# Patient Record
Sex: FEMALE | Race: BLACK OR AFRICAN AMERICAN | NOT HISPANIC OR LATINO | Employment: FULL TIME | URBAN - METROPOLITAN AREA
[De-identification: names, ages, dates, MRNs, and addresses within clinical notes are randomized per-mention and may not be internally consistent; named-entity substitution may affect disease eponyms.]

---

## 2017-12-06 ENCOUNTER — OFFICE VISIT (OUTPATIENT)
Dept: DERMATOLOGY | Facility: CLINIC | Age: 42
End: 2017-12-06
Payer: COMMERCIAL

## 2017-12-06 DIAGNOSIS — L70.0 ACNE VULGARIS: ICD-10-CM

## 2017-12-06 DIAGNOSIS — L81.9 DYSCHROMIA: ICD-10-CM

## 2017-12-06 DIAGNOSIS — B36.0 TINEA VERSICOLOR: Primary | ICD-10-CM

## 2017-12-06 PROCEDURE — 99999 PR PBB SHADOW E&M-NEW PATIENT-LVL II: CPT | Mod: PBBFAC,,, | Performed by: DERMATOLOGY

## 2017-12-06 PROCEDURE — 99203 OFFICE O/P NEW LOW 30 MIN: CPT | Mod: S$GLB,,, | Performed by: DERMATOLOGY

## 2017-12-06 RX ORDER — FLUCONAZOLE 200 MG/1
TABLET ORAL
Qty: 4 TABLET | Refills: 3 | Status: SHIPPED | OUTPATIENT
Start: 2017-12-06 | End: 2023-06-26 | Stop reason: SDUPTHER

## 2017-12-06 RX ORDER — TRETINOIN 0.25 MG/G
CREAM TOPICAL
Qty: 20 G | Refills: 6 | Status: SHIPPED | OUTPATIENT
Start: 2017-12-06 | End: 2018-12-06

## 2017-12-06 RX ORDER — DOXYCYCLINE 100 MG/1
CAPSULE ORAL
COMMUNITY
Start: 2017-10-03 | End: 2017-12-06

## 2017-12-06 RX ORDER — AMLODIPINE AND OLMESARTAN MEDOXOMIL 5; 20 MG/1; MG/1
TABLET ORAL
COMMUNITY
Start: 2017-11-07 | End: 2024-01-29 | Stop reason: SDUPTHER

## 2017-12-06 RX ORDER — FLUCONAZOLE 150 MG/1
TABLET ORAL
COMMUNITY
Start: 2017-08-31 | End: 2024-01-29 | Stop reason: DRUGHIGH

## 2017-12-06 RX ORDER — KETOCONAZOLE 2 G/100G
AEROSOL, FOAM TOPICAL
Qty: 100 G | Refills: 3 | Status: ON HOLD | OUTPATIENT
Start: 2017-12-06 | End: 2024-02-20 | Stop reason: HOSPADM

## 2017-12-06 RX ORDER — TERBINAFINE HYDROCHLORIDE 250 MG/1
250 TABLET ORAL
COMMUNITY
Start: 2016-12-12 | End: 2017-12-06

## 2017-12-06 RX ORDER — AMPICILLIN 500 MG/1
CAPSULE ORAL
COMMUNITY
Start: 2017-11-27 | End: 2024-01-29

## 2017-12-06 RX ORDER — BUPROPION HYDROCHLORIDE 150 MG/1
TABLET ORAL
COMMUNITY
Start: 2016-12-12 | End: 2017-12-06

## 2017-12-06 RX ORDER — METRONIDAZOLE 500 MG/1
TABLET ORAL
Status: ON HOLD | COMMUNITY
Start: 2017-11-14 | End: 2024-02-20 | Stop reason: HOSPADM

## 2017-12-06 RX ORDER — ALPRAZOLAM 1 MG/1
TABLET ORAL
COMMUNITY
Start: 2016-12-12 | End: 2017-12-06

## 2017-12-06 RX ORDER — ALPRAZOLAM 2 MG/1
TABLET ORAL
COMMUNITY
Start: 2017-09-20 | End: 2024-01-29 | Stop reason: SDUPTHER

## 2017-12-06 RX ORDER — BUPROPION HYDROCHLORIDE 150 MG/1
150 TABLET ORAL DAILY
COMMUNITY

## 2017-12-06 NOTE — PATIENT INSTRUCTIONS
Tinea Versicolor  This is a rash caused by a fungus in the top layers of the skin. This fungus is normally present in the pores of the skin and causes no symptoms. But when the fungus overgrows it causes a rash. The fungus grows more easily in hot climates, and on oily or sweaty skin. Health experts dont know why some people get this rash and others dont. Experts also dont know why the rash will suddenly appear in someone who has never had it before.  The rash is made up of irregular pale or tan spots and patches. The rash is usually on the neck, upper back, chest, and shoulders. You may have mild itching, especially if you become overheated. But it doesn't cause other symptoms. Because these spots don't change color with sun exposure like normal skin, the rash may be lighter or darker than your normal skin.  This rash is harmless and usually causes no symptoms. The only reason for treatment is to improve appearance. Follow the advice below to clear the rash. It might take several months for normal skin color to return.  Home care  · Use a medicated dandruff shampoo over your whole body while in the shower. Dont use soap. Let the shampoo stay on for at least a few minutes before rinsing off. Do this every day for 4 weeks.  · As a different treatment, you may buy an antifungal cream (miconazole or clotrimazole, both available without a prescription). Use this 2 times a day for 7 days.   · This rash is not contagious to others. It cant be spread if someone touches it. So you dont have to worry about exposing others at school, , or work.  Prevention  This fungus can come back again (recur) after treatment. To prevent return of the rash, use medicated dandruff shampoo over your whole body when in the shower. Do this once a month for the next year. This is very important to do in the summertime. That is when the rash is most likely to recur.  Other prevention tips include:  · Avoid oily skin  products  · Wear loose clothing. Try to let your skin stay cool and breathe.  · Use sunscreen and protect yourself from sunlight  · Avoid tanning beds  Follow-up care  Follow up with your healthcare provider, or as advised. Call your provider if the rash doesnt get better with the above treatment, or if new symptoms appear.  When to seek medical advice  Call your healthcare provider right away if any of these occur:  · Increasing redness of the rash  · Change in appearance of the rash  · Fever of 100.4ºF (38ºC) or higher, or as directed by your provider  Date Last Reviewed: 8/1/2016  © 6820-5157 [employer]. [address] [place] [address], [place] [address]. All rights reserved. This information is not intended as a substitute for professional medical care. Always follow your healthcare professional's instructions.

## 2017-12-06 NOTE — PROGRESS NOTES
Subjective:       Patient ID:  Bessy Egan is a 42 y.o. female who presents for   Chief Complaint   Patient presents with    Tinea     c/o tinea versicolor face and trunk x 20 years    Hyperpigmentation     c/o dark spots around eyes x 15 years     Spot     c/o light spots bilateral legs x 5 years      History of Present Illness: The patient presents with chief complaint of tinea versicolor.  Location: face, chest, back, and neck  Duration: 20 years  Signs/Symptoms: pruritis     Prior treatments: ketoconazole shampoo and cream, oral ketoconazole, hydrocortisone lotion          Review of Systems   Constitutional: Negative for fever and chills.   Gastrointestinal: Negative for nausea and vomiting.   Skin: Positive for itching and rash. Negative for daily sunscreen use, activity-related sunscreen use and recent sunburn.   Hematologic/Lymphatic: Does not bruise/bleed easily.        Objective:    Physical Exam   Constitutional: She appears well-developed and well-nourished. No distress.   Neurological: She is alert and oriented to person, place, and time. She is not disoriented.   Psychiatric: She has a normal mood and affect.   Skin:   Areas Examined (abnormalities noted in diagram):   Head / Face Inspection Performed  Neck Inspection Performed  Chest / Axilla Inspection Performed  Abdomen Inspection Performed  Back Inspection Performed  RUE Inspected  LUE Inspection Performed  RLE Inspected  LLE Inspection Performed  Nails and Digits Inspection Performed                       Assessment / Plan:        Tinea versicolor  -     ketoconazole 2 % Foam; AAA bid prn tinea versicolor  Dispense: 100 g; Refill: 3  -     fluconazole (DIFLUCAN) 200 MG Tab; 1 po biw x 2 weeks  Dispense: 4 tablet; Refill: 3  -     Pt c/o vehicle of cream and shampoo.  Will try foam and start diflucan prn flares.     Dyschromia  -     ketoconazole 2 % Foam; AAA bid prn tinea versicolor  Dispense: 100 g; Refill: 3  -     fluconazole  (DIFLUCAN) 200 MG Tab; 1 po biw x 2 weeks  Dispense: 4 tablet; Refill: 3  -     tretinoin (RETIN-A) 0.025 % cream; Apply pea-sized amount to entire face at bedtime.  If dryness, use every third night and increase as tolerated to every night.  Dispense: 20 g; Refill: 6  -     S/p use of HTC long-term on face.  Will start tretinoin cream and recommend daily sunscreen.     Acne vulgaris  -     tretinoin (RETIN-A) 0.025 % cream; Apply pea-sized amount to entire face at bedtime.  If dryness, use every third night and increase as tolerated to every night.  Dispense: 20 g; Refill: 6             Return if symptoms worsen or fail to improve.

## 2017-12-06 NOTE — LETTER
December 7, 2017      Fahad Russell MD  [address]  [place] [address]           [facility]  [address]  [place] [address]  Phone: [phone]  Fax: [phone]          Patient: Bessy Egan   MR Number: [id number]   YOB: 1975   Date of Visit: 12/6/2017       Dear Dr. Fahad Russell:    Thank you for referring Bessy Egan to me for evaluation. Attached you will find relevant portions of my assessment and plan of care.    If you have questions, please do not hesitate to call me. I look forward to following Bessy Egan along with you.    Sincerely,    Korin Schwab MD    Enclosure  CC:  No Recipients    If you would like to receive this communication electronically, please contact [email or web address] or [phone] to request more information on [email or web address] [employer] access.    For providers and/or their staff who would like to refer a patient to [facility], please contact us through our one-stop-shop provider referral line, [facility] , at [phone].    If you feel you have received this communication in error or would no longer like to receive these types of communications, please e-mail [email or web address]

## 2018-01-24 ENCOUNTER — OFFICE VISIT (OUTPATIENT)
Dept: URGENT CARE | Facility: CLINIC | Age: 43
End: 2018-01-24
Payer: COMMERCIAL

## 2018-01-24 VITALS
DIASTOLIC BLOOD PRESSURE: 79 MMHG | SYSTOLIC BLOOD PRESSURE: 141 MMHG | HEIGHT: 69 IN | TEMPERATURE: 99 F | HEART RATE: 91 BPM | WEIGHT: 178.56 LBS | BODY MASS INDEX: 26.45 KG/M2 | OXYGEN SATURATION: 98 %

## 2018-01-24 DIAGNOSIS — R06.2 WHEEZING: ICD-10-CM

## 2018-01-24 DIAGNOSIS — J01.00 ACUTE NON-RECURRENT MAXILLARY SINUSITIS: Primary | ICD-10-CM

## 2018-01-24 DIAGNOSIS — R05.9 COUGH: ICD-10-CM

## 2018-01-24 PROCEDURE — 3008F BODY MASS INDEX DOCD: CPT | Mod: S$GLB,,, | Performed by: NURSE PRACTITIONER

## 2018-01-24 PROCEDURE — 99999 PR PBB SHADOW E&M-EST. PATIENT-LVL III: CPT | Mod: PBBFAC,,, | Performed by: NURSE PRACTITIONER

## 2018-01-24 PROCEDURE — 99214 OFFICE O/P EST MOD 30 MIN: CPT | Mod: S$GLB,,, | Performed by: NURSE PRACTITIONER

## 2018-01-24 RX ORDER — ALBUTEROL SULFATE 90 UG/1
2 AEROSOL, METERED RESPIRATORY (INHALATION) EVERY 6 HOURS PRN
Qty: 18 G | Refills: 0 | Status: SHIPPED | OUTPATIENT
Start: 2018-01-24 | End: 2022-03-21

## 2018-01-24 RX ORDER — PREDNISONE 20 MG/1
20 TABLET ORAL DAILY
Qty: 4 TABLET | Refills: 0 | Status: SHIPPED | OUTPATIENT
Start: 2018-01-24 | End: 2018-01-28

## 2018-01-24 RX ORDER — PROMETHAZINE HYDROCHLORIDE AND PHENYLEPHRINE HYDROCHLORIDE 6.25; 5 MG/5ML; MG/5ML
5 SYRUP ORAL EVERY 8 HOURS PRN
Qty: 120 ML | Refills: 0 | Status: SHIPPED | OUTPATIENT
Start: 2018-01-24 | End: 2018-02-03

## 2018-01-24 RX ORDER — AZITHROMYCIN 500 MG/1
500 TABLET, FILM COATED ORAL DAILY
Qty: 5 TABLET | Refills: 0 | Status: SHIPPED | OUTPATIENT
Start: 2018-01-24 | End: 2018-01-24 | Stop reason: SDUPTHER

## 2018-01-24 RX ORDER — ALBUTEROL SULFATE 90 UG/1
2 AEROSOL, METERED RESPIRATORY (INHALATION) EVERY 6 HOURS PRN
Qty: 18 G | Refills: 0 | Status: SHIPPED | OUTPATIENT
Start: 2018-01-24 | End: 2018-01-24 | Stop reason: SDUPTHER

## 2018-01-24 RX ORDER — AZITHROMYCIN 500 MG/1
500 TABLET, FILM COATED ORAL DAILY
Qty: 5 TABLET | Refills: 0 | Status: SHIPPED | OUTPATIENT
Start: 2018-01-24 | End: 2018-01-29

## 2018-01-24 RX ORDER — PREDNISONE 20 MG/1
20 TABLET ORAL DAILY
Qty: 4 TABLET | Refills: 0 | Status: SHIPPED | OUTPATIENT
Start: 2018-01-24 | End: 2018-01-24 | Stop reason: SDUPTHER

## 2018-01-24 RX ORDER — PROMETHAZINE HYDROCHLORIDE AND PHENYLEPHRINE HYDROCHLORIDE 6.25; 5 MG/5ML; MG/5ML
5 SYRUP ORAL EVERY 8 HOURS PRN
Qty: 120 ML | Refills: 0 | Status: SHIPPED | OUTPATIENT
Start: 2018-01-24 | End: 2018-01-24 | Stop reason: SDUPTHER

## 2018-01-24 NOTE — LETTER
January 24, 2018      [facility]  [address]  [place] [address]  Phone: [phone]       Patient: Bessy Egan   YOB: 1975  Date of Visit: 01/24/2018    To Whom It May Concern:    Bart Egan  was at [facility] on 01/24/2018. She may return to work/school on 01/26/2018 with no restrictions. If you have any questions or concerns, or if I can be of further assistance, please do not hesitate to contact me.    Sincerely,      SHAYY Roldan

## 2018-01-25 NOTE — PATIENT INSTRUCTIONS
Sinusitis (Antibiotic Treatment)    The sinuses are air-filled spaces within the bones of the face. They connect to the inside of the nose. Sinusitis is an inflammation of the tissue lining the sinus cavity. Sinus inflammation can occur during a cold. It can also be due to allergies to pollens and other particles in the air. Sinusitis can cause symptoms of sinus congestion and fullness. A sinus infection causes fever, headache and facial pain. There is often green or yellow drainage from the nose or into the back of the throat (post-nasal drip). You have been given antibiotics to treat this condition.  Home care:  · Take the full course of antibiotics as instructed. Do not stop taking them, even if you feel better.  · Drink plenty of water, hot tea, and other liquids. This may help thin mucus. It also may promote sinus drainage.  · Heat may help soothe painful areas of the face. Use a towel soaked in hot water. Or,  the shower and direct the hot spray onto your face. Using a vaporizer along with a menthol rub at night may also help.   · An expectorant containing guaifenesin may help thin the mucus and promote drainage from the sinuses.  · Over-the-counter decongestants may be used unless a similar medicine was prescribed. Nasal sprays work the fastest. Use one that contains phenylephrine or oxymetazoline. First blow the nose gently. Then use the spray. Do not use these medicines more often than directed on the label or symptoms may get worse. You may also use tablets containing pseudoephedrine. Avoid products that combine ingredients, because side effects may be increased. Read labels. You can also ask the pharmacist for help. (NOTE: Persons with high blood pressure should not use decongestants. They can raise blood pressure.)  · Over-the-counter antihistamines may help if allergies contributed to your sinusitis.    · Do not use nasal rinses or irrigation during an acute sinus infection, unless told to by  your health care provider. Rinsing may spread the infection to other sinuses.  · Use acetaminophen or ibuprofen to control pain, unless another pain medicine was prescribed. (If you have chronic liver or kidney disease or ever had a stomach ulcer, talk with your doctor before using these medicines. Aspirin should never be used in anyone under 18 years of age who is ill with a fever. It may cause severe liver damage.)  · Don't smoke. This can worsen symptoms.  Follow-up care  Follow up with your healthcare provider or our staff if you are not improving within the next week.  When to seek medical advice  Call your healthcare provider if any of these occur:  · Facial pain or headache becoming more severe  · Stiff neck  · Unusual drowsiness or confusion  · Swelling of the forehead or eyelids  · Vision problems, including blurred or double vision  · Fever of 100.4ºF (38ºC) or higher, or as directed by your healthcare provider  · Seizure  · Breathing problems  · Symptoms not resolving within 10 days  Date Last Reviewed: 4/13/2015  © 9303-6299 [employer]. [address] [place] [address], [place] [address]. All rights reserved. This information is not intended as a substitute for professional medical care. Always follow your healthcare professional's instructions.

## 2018-02-13 NOTE — PROGRESS NOTES
Subjective:       Patient ID: Bessy Egan is a 42 y.o. female.    Chief Complaint: Cough; Chest Congestion; Neck Pain; and Headache    42 year old female presents to Urgent Care with reports of sinus and chest congestion that has been present for about 2 weeks with no improvement. Denies any other problems or concerns at this time.       Sinusitis   This is a new problem. The current episode started 1 to 4 weeks ago. The problem has been gradually worsening since onset. There has been no fever. Her pain is at a severity of 3/10. The pain is moderate. Associated symptoms include congestion, coughing, sinus pressure and a sore throat. Pertinent negatives include no chills, ear pain or shortness of breath. Past treatments include nothing.     Review of Systems   Constitutional: Negative for appetite change, chills and fever.   HENT: Positive for congestion, sinus pressure and sore throat. Negative for ear pain and trouble swallowing.    Eyes: Negative for visual disturbance.   Respiratory: Positive for cough. Negative for shortness of breath.    Cardiovascular: Negative for chest pain.   Gastrointestinal: Negative for abdominal pain, diarrhea, nausea and vomiting.   Endocrine: Negative for cold intolerance, polyphagia and polyuria.   Genitourinary: Negative for decreased urine volume and dysuria.   Musculoskeletal: Negative for back pain.   Skin: Negative for rash.   Allergic/Immunologic: Negative for environmental allergies and food allergies.   Neurological: Negative for dizziness, tremors, weakness and numbness.   Hematological: Does not bruise/bleed easily.   Psychiatric/Behavioral: Negative for confusion and hallucinations. The patient is not nervous/anxious and is not hyperactive.    All other systems reviewed and are negative.      Objective:     Physical Exam   Constitutional: She is oriented to person, place, and time. She appears well-developed and well-nourished. No distress.   HENT:   Head:  Normocephalic.   Right Ear: Tympanic membrane, external ear and ear canal normal.   Left Ear: Tympanic membrane, external ear and ear canal normal.   Nose: No mucosal edema or rhinorrhea. Right sinus exhibits maxillary sinus tenderness. Right sinus exhibits no frontal sinus tenderness. Left sinus exhibits maxillary sinus tenderness. Left sinus exhibits no frontal sinus tenderness.   Mouth/Throat: Uvula is midline and oropharynx is clear and moist. No oropharyngeal exudate, posterior oropharyngeal edema or posterior oropharyngeal erythema.   Eyes: Conjunctivae and EOM are normal.   Neck: Normal range of motion. Neck supple.   Cardiovascular: Normal rate, regular rhythm and normal heart sounds.    Pulmonary/Chest: Effort normal and breath sounds normal. No accessory muscle usage. No apnea, no tachypnea and no bradypnea. No respiratory distress. She has no decreased breath sounds. She has no wheezes. She has no rhonchi. She has no rales.   Lymphadenopathy:        Head (right side): No submental, no submandibular and no tonsillar adenopathy present.        Head (left side): No submental, no submandibular and no tonsillar adenopathy present.     She has no cervical adenopathy.   Neurological: She is alert and oriented to person, place, and time.   Skin: Skin is warm and dry. She is not diaphoretic.     Assessment:     1. Acute non-recurrent maxillary sinusitis    2. Cough    3. Wheezing      Plan:   Bessy was seen today for cough, chest congestion, neck pain and headache.    Diagnoses and all orders for this visit:    Acute non-recurrent maxillary sinusitis    Cough    Wheezing    Other orders  -     Discontinue: albuterol 90 mcg/actuation inhaler; Inhale 2 puffs into the lungs every 6 (six) hours as needed for Wheezing. Rescue  -     Discontinue: azithromycin (ZITHROMAX) 500 MG tablet; Take 1 tablet (500 mg total) by mouth once daily.  -     Discontinue: predniSONE (DELTASONE) 20 MG tablet; Take 1 tablet (20 mg total) by  mouth once daily.  -     Discontinue: promethazine-phenylephrine (PROMETHAZINE VC) 6.25-5 mg/5 mL Syrp; Take 5 mLs by mouth every 8 (eight) hours as needed (cough at night).  -     predniSONE (DELTASONE) 20 MG tablet; Take 1 tablet (20 mg total) by mouth once daily.  -     azithromycin (ZITHROMAX) 500 MG tablet; Take 1 tablet (500 mg total) by mouth once daily.  -     promethazine-phenylephrine (PROMETHAZINE VC) 6.25-5 mg/5 mL Syrp; Take 5 mLs by mouth every 8 (eight) hours as needed (cough at night).  -     albuterol 90 mcg/actuation inhaler; Inhale 2 puffs into the lungs every 6 (six) hours as needed for Wheezing. Rescue    Instructed patient to follow prescribed treatment plan as directed and recommended follow up with PCP within 1 week or sooner if needed.

## 2024-01-29 ENCOUNTER — HOSPITAL ENCOUNTER (OUTPATIENT)
Dept: RADIOLOGY | Facility: HOSPITAL | Age: 49
Discharge: HOME | End: 2024-01-29
Attending: FAMILY MEDICINE
Payer: COMMERCIAL

## 2024-01-29 ENCOUNTER — OFFICE VISIT (OUTPATIENT)
Dept: INTERNAL MEDICINE | Facility: CLINIC | Age: 49
End: 2024-01-29
Payer: COMMERCIAL

## 2024-01-29 VITALS
HEIGHT: 69 IN | OXYGEN SATURATION: 97 % | DIASTOLIC BLOOD PRESSURE: 76 MMHG | TEMPERATURE: 98 F | BODY MASS INDEX: 27.75 KG/M2 | HEART RATE: 105 BPM | WEIGHT: 187.38 LBS | SYSTOLIC BLOOD PRESSURE: 122 MMHG

## 2024-01-29 DIAGNOSIS — Z13.1 DIABETES MELLITUS SCREENING: ICD-10-CM

## 2024-01-29 DIAGNOSIS — Z11.3 SCREEN FOR STD (SEXUALLY TRANSMITTED DISEASE): ICD-10-CM

## 2024-01-29 DIAGNOSIS — R22.2 LUMP OF SKIN OF BACK: ICD-10-CM

## 2024-01-29 DIAGNOSIS — F41.1 GENERALIZED ANXIETY DISORDER: Chronic | ICD-10-CM

## 2024-01-29 DIAGNOSIS — Z12.11 SCREEN FOR COLON CANCER: ICD-10-CM

## 2024-01-29 DIAGNOSIS — E78.5 HYPERLIPIDEMIA, UNSPECIFIED HYPERLIPIDEMIA TYPE: Chronic | ICD-10-CM

## 2024-01-29 DIAGNOSIS — B36.0 TINEA VERSICOLOR: Chronic | ICD-10-CM

## 2024-01-29 DIAGNOSIS — L81.9 DYSCHROMIA: Chronic | ICD-10-CM

## 2024-01-29 DIAGNOSIS — I10 PRIMARY HYPERTENSION: Primary | Chronic | ICD-10-CM

## 2024-01-29 DIAGNOSIS — J30.9 ALLERGIC RHINITIS, UNSPECIFIED SEASONALITY, UNSPECIFIED TRIGGER: Chronic | ICD-10-CM

## 2024-01-29 PROCEDURE — 4010F ACE/ARB THERAPY RXD/TAKEN: CPT | Mod: CPTII,S$GLB,, | Performed by: FAMILY MEDICINE

## 2024-01-29 PROCEDURE — 3044F HG A1C LEVEL LT 7.0%: CPT | Mod: CPTII,S$GLB,, | Performed by: FAMILY MEDICINE

## 2024-01-29 PROCEDURE — 3074F SYST BP LT 130 MM HG: CPT | Mod: CPTII,S$GLB,, | Performed by: FAMILY MEDICINE

## 2024-01-29 PROCEDURE — 76604 US EXAM CHEST: CPT | Mod: 26,,, | Performed by: RADIOLOGY

## 2024-01-29 PROCEDURE — 76604 US EXAM CHEST: CPT | Mod: TC,PO

## 2024-01-29 PROCEDURE — 1160F RVW MEDS BY RX/DR IN RCRD: CPT | Mod: CPTII,S$GLB,, | Performed by: FAMILY MEDICINE

## 2024-01-29 PROCEDURE — 3078F DIAST BP <80 MM HG: CPT | Mod: CPTII,S$GLB,, | Performed by: FAMILY MEDICINE

## 2024-01-29 PROCEDURE — 99204 OFFICE O/P NEW MOD 45 MIN: CPT | Mod: S$GLB,,, | Performed by: FAMILY MEDICINE

## 2024-01-29 PROCEDURE — 99999 PR PBB SHADOW E&M-EST. PATIENT-LVL IV: CPT | Mod: PBBFAC,,, | Performed by: FAMILY MEDICINE

## 2024-01-29 PROCEDURE — 3008F BODY MASS INDEX DOCD: CPT | Mod: CPTII,S$GLB,, | Performed by: FAMILY MEDICINE

## 2024-01-29 PROCEDURE — 1159F MED LIST DOCD IN RCRD: CPT | Mod: CPTII,S$GLB,, | Performed by: FAMILY MEDICINE

## 2024-01-29 RX ORDER — MONTELUKAST SODIUM 10 MG/1
10 TABLET ORAL DAILY
Qty: 90 TABLET | Refills: 2 | Status: SHIPPED | OUTPATIENT
Start: 2024-01-29

## 2024-01-29 RX ORDER — LEVOCETIRIZINE DIHYDROCHLORIDE 5 MG/1
5 TABLET, FILM COATED ORAL NIGHTLY
Qty: 90 TABLET | Refills: 2 | Status: SHIPPED | OUTPATIENT
Start: 2024-01-29

## 2024-01-29 RX ORDER — ROSUVASTATIN CALCIUM 10 MG/1
10 TABLET, COATED ORAL DAILY
Qty: 90 TABLET | Refills: 2 | Status: SHIPPED | OUTPATIENT
Start: 2024-01-29

## 2024-01-29 RX ORDER — AMLODIPINE AND OLMESARTAN MEDOXOMIL 5; 20 MG/1; MG/1
1 TABLET ORAL DAILY
Qty: 90 TABLET | Refills: 2 | Status: SHIPPED | OUTPATIENT
Start: 2024-01-29

## 2024-01-29 RX ORDER — FLUCONAZOLE 200 MG/1
TABLET ORAL
Qty: 24 TABLET | Refills: 1 | Status: SHIPPED | OUTPATIENT
Start: 2024-01-29 | End: 2024-04-29 | Stop reason: SDUPTHER

## 2024-01-29 RX ORDER — ALPRAZOLAM 2 MG/1
2 TABLET ORAL 2 TIMES DAILY PRN
Qty: 60 TABLET | Refills: 0 | Status: SHIPPED | OUTPATIENT
Start: 2024-01-29 | End: 2024-04-29 | Stop reason: SDUPTHER

## 2024-01-29 NOTE — PROGRESS NOTES
Subjective     Patient ID: Bessy Egan is a 48 y.o. female.    Chief Complaint: Establish Care    Patient presents to establish care. She has hypertension, hyperlipidemia, recurrent tinea versicolor, anxiety and allergies. Patient complains of nodule on upper left shoulder that she would like to get checked otu. Patient mentions Hot flashes and fibroid tumors. Not candidate for ocp due to smoking due to increased risk for blood clots      Review of Systems   Constitutional: Negative.    HENT: Negative.     Eyes:  Negative for discharge and redness.   Respiratory: Negative.     Cardiovascular: Negative.  Negative for chest pain, palpitations and leg swelling.   Gastrointestinal: Negative.  Negative for constipation and diarrhea.   Genitourinary:  Positive for hot flashes.   Musculoskeletal: Negative.  Negative for arthralgias and gait problem.   Neurological: Negative.  Negative for coordination difficulties.   Psychiatric/Behavioral: Negative.            Objective     Physical Exam  Vitals and nursing note reviewed.   Constitutional:       Appearance: Normal appearance. She is normal weight.   HENT:      Head: Normocephalic and atraumatic.   Eyes:      Extraocular Movements: Extraocular movements intact.   Cardiovascular:      Rate and Rhythm: Normal rate and regular rhythm.      Pulses: Normal pulses.      Heart sounds: Normal heart sounds.   Pulmonary:      Effort: Pulmonary effort is normal.      Breath sounds: Normal breath sounds.   Abdominal:      General: Abdomen is flat. Bowel sounds are normal.      Palpations: Abdomen is soft.   Musculoskeletal:      Right lower leg: No edema.      Left lower leg: No edema.   Skin:     General: Skin is warm and dry.      Comments: Soft fixed nodule upper left shoulder, nontender   Neurological:      General: No focal deficit present.      Mental Status: She is alert and oriented to person, place, and time.   Psychiatric:         Mood and Affect: Mood normal.          Behavior: Behavior normal.            Assessment and Plan     1. Primary hypertension  Comments:  stable on zackary  Orders:  -     amlodipine-olmesartan (ZACKARY) 5-20 mg per tablet; Take 1 tablet by mouth once daily.  Dispense: 90 tablet; Refill: 2  -     CBC W/ AUTO DIFFERENTIAL; Future; Expected date: 01/29/2024  -     COMPREHENSIVE METABOLIC PANEL; Future; Expected date: 01/29/2024  -     TSH; Future; Expected date: 01/29/2024  -     T4, free; Future; Expected date: 01/29/2024    2. Tinea versicolor  Comments:  chronic recurrent stable on fluconazole  Orders:  -     fluconazole (DIFLUCAN) 200 MG Tab; 1 po bi-weekly x 2 weeks  Dispense: 24 tablet; Refill: 1    3. Dyschromia  -     fluconazole (DIFLUCAN) 200 MG Tab; 1 po bi-weekly x 2 weeks  Dispense: 24 tablet; Refill: 1    4. Screen for colon cancer  -     Ambulatory referral/consult to Endo Procedure ; Future; Expected date: 01/30/2024    5. Hyperlipidemia, unspecified hyperlipidemia type  Comments:  stable on crestor  Orders:  -     rosuvastatin (CRESTOR) 10 MG tablet; Take 1 tablet (10 mg total) by mouth once daily.  Dispense: 90 tablet; Refill: 2  -     LIPID PANEL; Future; Expected date: 01/29/2024    6. Generalized anxiety disorder  Comments:  stable on xanax  Orders:  -     ALPRAZolam (XANAX) 2 MG Tab; Take 1 tablet (2 mg total) by mouth 2 (two) times daily as needed (anxiety).  Dispense: 60 tablet; Refill: 0    7. Screen for STD (sexually transmitted disease)  -     HEPATITIS C ANTIBODY; Future; Expected date: 01/29/2024  -     HIV 1/2 Ag/Ab (4th Gen); Future; Expected date: 01/29/2024    8. Diabetes mellitus screening  -     HEMOGLOBIN A1C; Future; Expected date: 01/29/2024    9. Lump of skin of back  Comments:  ultrasound to evaluate. has appearance of lipoma  Orders:  -     US Soft Tissue Chest_Upper Back; Future; Expected date: 01/29/2024    10. Allergic rhinitis, unspecified seasonality, unspecified trigger  Comments:  stable on singulair and  xyzal  Orders:  -     montelukast (SINGULAIR) 10 mg tablet; Take 1 tablet (10 mg total) by mouth once daily.  Dispense: 90 tablet; Refill: 2  -     levocetirizine (XYZAL) 5 MG tablet; Take 1 tablet (5 mg total) by mouth every evening.  Dispense: 90 tablet; Refill: 2               Follow up in about 3 months (around 4/29/2024).

## 2024-02-05 ENCOUNTER — HOSPITAL ENCOUNTER (OUTPATIENT)
Dept: PREADMISSION TESTING | Facility: HOSPITAL | Age: 49
Discharge: HOME | End: 2024-02-05
Attending: INTERNAL MEDICINE
Payer: COMMERCIAL

## 2024-02-05 DIAGNOSIS — Z12.11 SCREEN FOR COLON CANCER: ICD-10-CM

## 2024-02-07 ENCOUNTER — HOSPITAL ENCOUNTER (OUTPATIENT)
Dept: PREADMISSION TESTING | Facility: HOSPITAL | Age: 49
Discharge: HOME | End: 2024-02-07
Attending: INTERNAL MEDICINE
Payer: COMMERCIAL

## 2024-02-07 DIAGNOSIS — Z12.11 SCREEN FOR COLON CANCER: Primary | ICD-10-CM

## 2024-02-07 RX ORDER — SODIUM, POTASSIUM,MAG SULFATES 17.5-3.13G
1 SOLUTION, RECONSTITUTED, ORAL ORAL DAILY
Qty: 1 KIT | Refills: 0 | Status: SHIPPED | OUTPATIENT
Start: 2024-02-07 | End: 2024-02-09

## 2024-02-07 RX ORDER — SODIUM, POTASSIUM,MAG SULFATES 17.5-3.13G
1 SOLUTION, RECONSTITUTED, ORAL ORAL DAILY
Qty: 1 KIT | Refills: 0 | Status: CANCELLED | OUTPATIENT
Start: 2024-02-07 | End: 2024-02-09

## 2024-02-16 ENCOUNTER — ANESTHESIA EVENT (OUTPATIENT)
Dept: ENDOSCOPY | Facility: HOSPITAL | Age: 49
End: 2024-02-16
Payer: COMMERCIAL

## 2024-02-16 NOTE — ANESTHESIA PREPROCEDURE EVALUATION
02/16/2024  Bessy Egan is a 48 y.o., female.    Past Medical History:   Diagnosis Date    Hypertension     Kidney stones 01/23/2023     Past Surgical History:   Procedure Laterality Date    BREAST BIOPSY Left 2018    HERNIA REPAIR      kidney stones       There is no problem list on file for this patient.      Pre-op Assessment    I have reviewed the Patient Summary Reports.     I have reviewed the Nursing Notes. I have reviewed the NPO Status.   I have reviewed the Medications.     Review of Systems  Anesthesia Hx:  No problems with previous Anesthesia             Denies Family Hx of Anesthesia complications.    Denies Personal Hx of Anesthesia complications.                    Social:  Non-Smoker, No Alcohol Use       Hematology/Oncology:  Hematology Normal   Oncology Normal                                   EENT/Dental:  EENT/Dental Normal           Cardiovascular:     Hypertension                                        Pulmonary:  Pulmonary Normal                       Renal/:  Chronic Renal Disease renal calculi               Hepatic/GI:  Bowel Prep.                Musculoskeletal:  Musculoskeletal Normal                Neurological:  Neurology Normal                                      Endocrine:  Endocrine Normal            Dermatological:  Skin Normal    Psych:  Psychiatric Normal                    Physical Exam  General: Well nourished, Cooperative, Alert and Oriented    Airway:  Mallampati: II   Mouth Opening: Normal  TM Distance: Normal  Tongue: Normal  Neck ROM: Normal ROM    Dental:  Intact    Chest/Lungs:  Normal Respiratory Rate        Anesthesia Plan  Type of Anesthesia, risks & benefits discussed:    Anesthesia Type: Gen Natural Airway  Intra-op Monitoring Plan: Standard ASA Monitors  Post Op Pain Control Plan: multimodal analgesia  Induction:  IV  Informed Consent: Informed consent  signed with the Patient and all parties understand the risks and agree with anesthesia plan.  All questions answered. Patient consented to blood products? No  ASA Score: 2  Day of Surgery Review of History & Physical: H&P Update referred to the surgeon/provider.    Ready For Surgery From Anesthesia Perspective.     .

## 2024-02-19 ENCOUNTER — ANESTHESIA (OUTPATIENT)
Dept: ENDOSCOPY | Facility: HOSPITAL | Age: 49
End: 2024-02-19
Payer: COMMERCIAL

## 2024-02-19 ENCOUNTER — HOSPITAL ENCOUNTER (OUTPATIENT)
Facility: HOSPITAL | Age: 49
Discharge: HOME | End: 2024-02-19
Attending: INTERNAL MEDICINE | Admitting: INTERNAL MEDICINE
Payer: COMMERCIAL

## 2024-02-19 ENCOUNTER — HOSPITAL ENCOUNTER (OUTPATIENT)
Facility: HOSPITAL | Age: 49
Discharge: HOME | End: 2024-02-20
Attending: EMERGENCY MEDICINE | Admitting: HOSPITALIST
Payer: COMMERCIAL

## 2024-02-19 VITALS
DIASTOLIC BLOOD PRESSURE: 85 MMHG | HEART RATE: 87 BPM | WEIGHT: 186.75 LBS | BODY MASS INDEX: 27.66 KG/M2 | RESPIRATION RATE: 16 BRPM | SYSTOLIC BLOOD PRESSURE: 125 MMHG | OXYGEN SATURATION: 99 % | HEIGHT: 69 IN | TEMPERATURE: 97 F

## 2024-02-19 DIAGNOSIS — Z98.890 S/P COLONOSCOPIC POLYPECTOMY: Primary | ICD-10-CM

## 2024-02-19 DIAGNOSIS — Z12.11 ENCOUNTER FOR SCREENING COLONOSCOPY: Primary | ICD-10-CM

## 2024-02-19 DIAGNOSIS — R07.9 CHEST PAIN: ICD-10-CM

## 2024-02-19 PROBLEM — R55 SYNCOPE: Status: ACTIVE | Noted: 2024-02-19

## 2024-02-19 PROBLEM — K92.2 GI BLEED: Status: ACTIVE | Noted: 2024-02-19

## 2024-02-19 LAB
ALBUMIN SERPL BCP-MCNC: 3.9 G/DL (ref 3.5–5.2)
ALP SERPL-CCNC: 110 U/L (ref 55–135)
ALT SERPL W/O P-5'-P-CCNC: 21 U/L (ref 10–44)
ANION GAP SERPL CALC-SCNC: 13 MMOL/L (ref 8–16)
AST SERPL-CCNC: 20 U/L (ref 10–40)
B-HCG UR QL: NEGATIVE
BASOPHILS # BLD AUTO: 0.05 K/UL (ref 0–0.2)
BASOPHILS NFR BLD: 0.8 % (ref 0–1.9)
BILIRUB SERPL-MCNC: 0.5 MG/DL (ref 0.1–1)
BUN SERPL-MCNC: 8 MG/DL (ref 6–20)
CALCIUM SERPL-MCNC: 9.1 MG/DL (ref 8.7–10.5)
CHLORIDE SERPL-SCNC: 106 MMOL/L (ref 95–110)
CO2 SERPL-SCNC: 22 MMOL/L (ref 23–29)
CREAT SERPL-MCNC: 0.9 MG/DL (ref 0.5–1.4)
CTP QC/QA: YES
DIFFERENTIAL METHOD BLD: ABNORMAL
EOSINOPHIL # BLD AUTO: 0.1 K/UL (ref 0–0.5)
EOSINOPHIL NFR BLD: 1.7 % (ref 0–8)
ERYTHROCYTE [DISTWIDTH] IN BLOOD BY AUTOMATED COUNT: 16.6 % (ref 11.5–14.5)
EST. GFR  (NO RACE VARIABLE): >60 ML/MIN/1.73 M^2
GLUCOSE SERPL-MCNC: 125 MG/DL (ref 70–110)
HCT VFR BLD AUTO: 40.7 % (ref 37–48.5)
HGB BLD-MCNC: 13.1 G/DL (ref 12–16)
IMM GRANULOCYTES # BLD AUTO: 0.02 K/UL (ref 0–0.04)
IMM GRANULOCYTES NFR BLD AUTO: 0.3 % (ref 0–0.5)
LYMPHOCYTES # BLD AUTO: 2 K/UL (ref 1–4.8)
LYMPHOCYTES NFR BLD: 33.7 % (ref 18–48)
MCH RBC QN AUTO: 27.5 PG (ref 27–31)
MCHC RBC AUTO-ENTMCNC: 32.2 G/DL (ref 32–36)
MCV RBC AUTO: 86 FL (ref 82–98)
MONOCYTES # BLD AUTO: 0.5 K/UL (ref 0.3–1)
MONOCYTES NFR BLD: 7.8 % (ref 4–15)
NEUTROPHILS # BLD AUTO: 3.4 K/UL (ref 1.8–7.7)
NEUTROPHILS NFR BLD: 55.7 % (ref 38–73)
NRBC BLD-RTO: 0 /100 WBC
PLATELET # BLD AUTO: 340 K/UL (ref 150–450)
PMV BLD AUTO: 10.4 FL (ref 9.2–12.9)
POTASSIUM SERPL-SCNC: 3.5 MMOL/L (ref 3.5–5.1)
PROT SERPL-MCNC: 7 G/DL (ref 6–8.4)
RBC # BLD AUTO: 4.76 M/UL (ref 4–5.4)
SODIUM SERPL-SCNC: 141 MMOL/L (ref 136–145)
WBC # BLD AUTO: 6.03 K/UL (ref 3.9–12.7)

## 2024-02-19 PROCEDURE — 63600175 PHARM REV CODE 636 W HCPCS: Performed by: INTERNAL MEDICINE

## 2024-02-19 PROCEDURE — 88305 TISSUE EXAM BY PATHOLOGIST: CPT | Mod: 26,,, | Performed by: STUDENT IN AN ORGANIZED HEALTH CARE EDUCATION/TRAINING PROGRAM

## 2024-02-19 PROCEDURE — 37000008 HC ANESTHESIA 1ST 15 MINUTES: Performed by: INTERNAL MEDICINE

## 2024-02-19 PROCEDURE — 25000003 PHARM REV CODE 250: Performed by: NURSE ANESTHETIST, CERTIFIED REGISTERED

## 2024-02-19 PROCEDURE — 25000003 PHARM REV CODE 250: Performed by: INTERNAL MEDICINE

## 2024-02-19 PROCEDURE — 88305 TISSUE EXAM BY PATHOLOGIST: CPT | Performed by: STUDENT IN AN ORGANIZED HEALTH CARE EDUCATION/TRAINING PROGRAM

## 2024-02-19 PROCEDURE — G0378 HOSPITAL OBSERVATION PER HR: HCPCS

## 2024-02-19 PROCEDURE — 85025 COMPLETE CBC W/AUTO DIFF WBC: CPT | Performed by: NURSE PRACTITIONER

## 2024-02-19 PROCEDURE — 99285 EMERGENCY DEPT VISIT HI MDM: CPT | Mod: 25

## 2024-02-19 PROCEDURE — 63600175 PHARM REV CODE 636 W HCPCS: Performed by: NURSE ANESTHETIST, CERTIFIED REGISTERED

## 2024-02-19 PROCEDURE — 80053 COMPREHEN METABOLIC PANEL: CPT | Performed by: NURSE PRACTITIONER

## 2024-02-19 PROCEDURE — 27201012 HC FORCEPS, HOT/COLD, DISP: Performed by: INTERNAL MEDICINE

## 2024-02-19 PROCEDURE — 45380 COLONOSCOPY AND BIOPSY: CPT | Mod: 33,59,, | Performed by: INTERNAL MEDICINE

## 2024-02-19 PROCEDURE — 37000009 HC ANESTHESIA EA ADD 15 MINS: Performed by: INTERNAL MEDICINE

## 2024-02-19 PROCEDURE — 45385 COLONOSCOPY W/LESION REMOVAL: CPT | Mod: PT | Performed by: INTERNAL MEDICINE

## 2024-02-19 PROCEDURE — 27201089 HC SNARE, DISP (ANY): Performed by: INTERNAL MEDICINE

## 2024-02-19 PROCEDURE — 27200997: Performed by: INTERNAL MEDICINE

## 2024-02-19 PROCEDURE — 25500020 PHARM REV CODE 255: Performed by: EMERGENCY MEDICINE

## 2024-02-19 PROCEDURE — 25000003 PHARM REV CODE 250: Performed by: NURSE PRACTITIONER

## 2024-02-19 PROCEDURE — 45385 COLONOSCOPY W/LESION REMOVAL: CPT | Mod: 33,,, | Performed by: INTERNAL MEDICINE

## 2024-02-19 PROCEDURE — D9220A PRA ANESTHESIA: Mod: ,,, | Performed by: NURSE ANESTHETIST, CERTIFIED REGISTERED

## 2024-02-19 PROCEDURE — 81025 URINE PREGNANCY TEST: CPT | Performed by: INTERNAL MEDICINE

## 2024-02-19 PROCEDURE — 45380 COLONOSCOPY AND BIOPSY: CPT | Mod: PT,59 | Performed by: INTERNAL MEDICINE

## 2024-02-19 RX ORDER — ALPRAZOLAM 1 MG/1
2 TABLET ORAL 2 TIMES DAILY PRN
Status: DISCONTINUED | OUTPATIENT
Start: 2024-02-20 | End: 2024-02-20 | Stop reason: HOSPADM

## 2024-02-19 RX ORDER — MORPHINE SULFATE 4 MG/ML
2 INJECTION, SOLUTION INTRAMUSCULAR; INTRAVENOUS EVERY 4 HOURS PRN
Status: DISCONTINUED | OUTPATIENT
Start: 2024-02-19 | End: 2024-02-20 | Stop reason: HOSPADM

## 2024-02-19 RX ORDER — ACETAMINOPHEN 650 MG/1
650 SUPPOSITORY RECTAL EVERY 6 HOURS PRN
Status: DISCONTINUED | OUTPATIENT
Start: 2024-02-19 | End: 2024-02-20 | Stop reason: HOSPADM

## 2024-02-19 RX ORDER — ALUMINUM HYDROXIDE, MAGNESIUM HYDROXIDE, AND SIMETHICONE 1200; 120; 1200 MG/30ML; MG/30ML; MG/30ML
30 SUSPENSION ORAL 4 TIMES DAILY PRN
Status: DISCONTINUED | OUTPATIENT
Start: 2024-02-19 | End: 2024-02-20 | Stop reason: HOSPADM

## 2024-02-19 RX ORDER — DEXTROMETHORPHAN/PSEUDOEPHED 2.5-7.5/.8
DROPS ORAL
Status: COMPLETED | OUTPATIENT
Start: 2024-02-19 | End: 2024-02-19

## 2024-02-19 RX ORDER — IBUPROFEN 200 MG
24 TABLET ORAL
Status: DISCONTINUED | OUTPATIENT
Start: 2024-02-19 | End: 2024-02-20 | Stop reason: HOSPADM

## 2024-02-19 RX ORDER — AMLODIPINE BESYLATE 5 MG/1
5 TABLET ORAL DAILY
Status: DISCONTINUED | OUTPATIENT
Start: 2024-02-20 | End: 2024-02-20 | Stop reason: HOSPADM

## 2024-02-19 RX ORDER — LIDOCAINE HYDROCHLORIDE 20 MG/ML
INJECTION, SOLUTION EPIDURAL; INFILTRATION; INTRACAUDAL; PERINEURAL
Status: DISCONTINUED | OUTPATIENT
Start: 2024-02-19 | End: 2024-02-19

## 2024-02-19 RX ORDER — GLUCAGON 1 MG
1 KIT INJECTION
Status: DISCONTINUED | OUTPATIENT
Start: 2024-02-19 | End: 2024-02-20 | Stop reason: HOSPADM

## 2024-02-19 RX ORDER — NALOXONE HCL 0.4 MG/ML
0.02 VIAL (ML) INJECTION
Status: DISCONTINUED | OUTPATIENT
Start: 2024-02-19 | End: 2024-02-20 | Stop reason: HOSPADM

## 2024-02-19 RX ORDER — ONDANSETRON HYDROCHLORIDE 2 MG/ML
4 INJECTION, SOLUTION INTRAVENOUS EVERY 8 HOURS PRN
Status: DISCONTINUED | OUTPATIENT
Start: 2024-02-19 | End: 2024-02-20 | Stop reason: HOSPADM

## 2024-02-19 RX ORDER — ALPRAZOLAM 0.25 MG/1
0.25 TABLET ORAL ONCE AS NEEDED
Status: COMPLETED | OUTPATIENT
Start: 2024-02-19 | End: 2024-02-19

## 2024-02-19 RX ORDER — ATORVASTATIN CALCIUM 10 MG/1
10 TABLET, FILM COATED ORAL DAILY
Status: DISCONTINUED | OUTPATIENT
Start: 2024-02-20 | End: 2024-02-20 | Stop reason: HOSPADM

## 2024-02-19 RX ORDER — AMOXICILLIN 250 MG
1 CAPSULE ORAL 2 TIMES DAILY PRN
Status: DISCONTINUED | OUTPATIENT
Start: 2024-02-19 | End: 2024-02-20 | Stop reason: HOSPADM

## 2024-02-19 RX ORDER — SODIUM CHLORIDE 0.9 % (FLUSH) 0.9 %
10 SYRINGE (ML) INJECTION EVERY 12 HOURS PRN
Status: DISCONTINUED | OUTPATIENT
Start: 2024-02-19 | End: 2024-02-20 | Stop reason: HOSPADM

## 2024-02-19 RX ORDER — HYDROCODONE BITARTRATE AND ACETAMINOPHEN 5; 325 MG/1; MG/1
1 TABLET ORAL EVERY 6 HOURS PRN
Status: DISCONTINUED | OUTPATIENT
Start: 2024-02-19 | End: 2024-02-20 | Stop reason: HOSPADM

## 2024-02-19 RX ORDER — IPRATROPIUM BROMIDE AND ALBUTEROL SULFATE 2.5; .5 MG/3ML; MG/3ML
3 SOLUTION RESPIRATORY (INHALATION) EVERY 6 HOURS PRN
Status: DISCONTINUED | OUTPATIENT
Start: 2024-02-19 | End: 2024-02-20 | Stop reason: HOSPADM

## 2024-02-19 RX ORDER — IBUPROFEN 200 MG
16 TABLET ORAL
Status: DISCONTINUED | OUTPATIENT
Start: 2024-02-19 | End: 2024-02-20 | Stop reason: HOSPADM

## 2024-02-19 RX ORDER — LOSARTAN POTASSIUM 50 MG/1
50 TABLET ORAL DAILY
Status: DISCONTINUED | OUTPATIENT
Start: 2024-02-20 | End: 2024-02-20 | Stop reason: HOSPADM

## 2024-02-19 RX ORDER — AMLODIPINE AND OLMESARTAN MEDOXOMIL 5; 20 MG/1; MG/1
1 TABLET ORAL DAILY
Status: DISCONTINUED | OUTPATIENT
Start: 2024-02-20 | End: 2024-02-19

## 2024-02-19 RX ORDER — PROMETHAZINE HYDROCHLORIDE 25 MG/1
25 TABLET ORAL EVERY 6 HOURS PRN
Status: DISCONTINUED | OUTPATIENT
Start: 2024-02-19 | End: 2024-02-20 | Stop reason: HOSPADM

## 2024-02-19 RX ORDER — SODIUM CHLORIDE, SODIUM LACTATE, POTASSIUM CHLORIDE, CALCIUM CHLORIDE 600; 310; 30; 20 MG/100ML; MG/100ML; MG/100ML; MG/100ML
INJECTION, SOLUTION INTRAVENOUS CONTINUOUS
Status: DISCONTINUED | OUTPATIENT
Start: 2024-02-19 | End: 2024-02-19 | Stop reason: HOSPADM

## 2024-02-19 RX ORDER — ACETAMINOPHEN 325 MG/1
650 TABLET ORAL EVERY 8 HOURS PRN
Status: DISCONTINUED | OUTPATIENT
Start: 2024-02-19 | End: 2024-02-20 | Stop reason: HOSPADM

## 2024-02-19 RX ORDER — SYRING-NEEDL,DISP,INSUL,0.3 ML 29 G X1/2"
296 SYRINGE, EMPTY DISPOSABLE MISCELLANEOUS
Status: COMPLETED | OUTPATIENT
Start: 2024-02-19 | End: 2024-02-20

## 2024-02-19 RX ORDER — PROPOFOL 10 MG/ML
VIAL (ML) INTRAVENOUS
Status: DISCONTINUED | OUTPATIENT
Start: 2024-02-19 | End: 2024-02-19

## 2024-02-19 RX ORDER — TALC
6 POWDER (GRAM) TOPICAL NIGHTLY PRN
Status: DISCONTINUED | OUTPATIENT
Start: 2024-02-19 | End: 2024-02-20 | Stop reason: HOSPADM

## 2024-02-19 RX ADMIN — PROPOFOL 50 MG: 10 INJECTION, EMULSION INTRAVENOUS at 08:02

## 2024-02-19 RX ADMIN — SODIUM CHLORIDE, SODIUM LACTATE, POTASSIUM CHLORIDE, AND CALCIUM CHLORIDE: 600; 310; 30; 20 INJECTION, SOLUTION INTRAVENOUS at 07:02

## 2024-02-19 RX ADMIN — PROPOFOL 80 MG: 10 INJECTION, EMULSION INTRAVENOUS at 08:02

## 2024-02-19 RX ADMIN — IOHEXOL 100 ML: 350 INJECTION, SOLUTION INTRAVENOUS at 08:02

## 2024-02-19 RX ADMIN — PROPOFOL 50 MG: 10 INJECTION, EMULSION INTRAVENOUS at 07:02

## 2024-02-19 RX ADMIN — ALPRAZOLAM 0.25 MG: 0.25 TABLET ORAL at 10:02

## 2024-02-19 RX ADMIN — PROPOFOL 80 MG: 10 INJECTION, EMULSION INTRAVENOUS at 07:02

## 2024-02-19 RX ADMIN — LIDOCAINE HYDROCHLORIDE 40 MG: 20 INJECTION, SOLUTION EPIDURAL; INFILTRATION; INTRACAUDAL; PERINEURAL at 07:02

## 2024-02-19 RX ADMIN — PROPOFOL 40 MG: 10 INJECTION, EMULSION INTRAVENOUS at 07:02

## 2024-02-19 NOTE — DISCHARGE SUMMARY
[facility] 1st [facility]  Discharge Note  Short Stay    Procedure(s) (LRB):  COLONOSCOPY (N/A)      OUTCOME: Patient tolerated treatment/procedure well without complication and is now ready for discharge.    DISPOSITION: Home or Self Care    FINAL DIAGNOSIS:  Encounter for screening colonoscopy    FOLLOWUP: With primary care provider    DISCHARGE INSTRUCTIONS:  No discharge procedures on file.      Clinical Reference Documents Added to Patient Instructions         Document    COLON POLYPS (ENGLISH)    DIVERTICULOSIS DISCHARGE INSTRUCTIONS (ENGLISH)

## 2024-02-19 NOTE — H&P
Short Stay Endoscopy History and Physical    PCP - Shobha Mendes MD    Procedure - Colonoscopy  ASA - per anesthesia  Mallampati - per anesthesia  History of Anesthesia problems - no  Family history Anesthesia problems -  no     HPI:  This is a 48 y.o. female here for evaluation of :   Active Hospital Problems    Diagnosis  POA    *Encounter for screening colonoscopy [Z12.11]  Not Applicable      Resolved Hospital Problems   No resolved problems to display.         Health Maintenance         Date Due Completion Date    Pneumococcal Vaccines (Age 0-64) (1 of 2 - PCV) Never done ---    TETANUS VACCINE Never done ---    Colorectal Cancer Screening Never done ---    Influenza Vaccine (1) Never done ---    COVID-19 Vaccine (2 - 2023-24 season) 09/01/2023 3/13/2021    Mammogram 03/27/2024 3/27/2023    Hemoglobin A1c (Diabetic Prevention Screening) 01/29/2027 1/29/2024    Cervical Cancer Screening 03/27/2028 3/27/2023    Lipid Panel 01/29/2029 1/29/2024              ROS:  CONSTITUTIONAL: Denies weight change,  fatigue, fevers, chills, night sweats.  CARDIOVASCULAR: Denies chest pain, shortness of breath, orthopnea and edema.  RESPIRATORY: Denies cough, hemoptysis, dyspnea, and wheezing.  GI: See HPI.    Medical History:   Past Medical History:   Diagnosis Date    Hypertension     Kidney stones 01/23/2023       Surgical History:   Past Surgical History:   Procedure Laterality Date    BREAST BIOPSY Left 2018    HERNIA REPAIR      kidney stones         Family History:   Family History   Problem Relation Age of Onset    Diabetes Mother     Diabetes Maternal Grandmother     Diabetes Maternal Grandfather        Social History:   Social History     Tobacco Use    Smoking status: Some Days     Types: Cigarettes    Smokeless tobacco: Never   Substance Use Topics    Alcohol use: No    Drug use: No       Allergies:   Review of patient's allergies indicates:   Allergen Reactions    Ace inhibitors Anaphylaxis, Shortness  Of Breath and Swelling     Other reaction(s): Reaction:Facial swelling; Alternate Reaction: Anaphylaxis;       Medications:   No current facility-administered medications on file prior to encounter.     Current Outpatient Medications on File Prior to Encounter   Medication Sig Dispense Refill    ALPRAZolam (XANAX) 2 MG Tab Take 1 tablet (2 mg total) by mouth 2 (two) times daily as needed (anxiety). 60 tablet 0    amlodipine-olmesartan (ZACKARY) 5-20 mg per tablet Take 1 tablet by mouth once daily. 90 tablet 2    buPROPion (WELLBUTRIN XL) 150 MG TB24 tablet Take 150 mg by mouth once daily.      fluconazole (DIFLUCAN) 200 MG Tab 1 po bi-weekly x 2 weeks 24 tablet 1    ketoconazole 2 % Foam AAA bid prn tinea versicolor (Patient not taking: Reported on 3/27/2023) 100 g 3    levocetirizine (XYZAL) 5 MG tablet Take 1 tablet (5 mg total) by mouth every evening. 90 tablet 2    metroNIDAZOLE (FLAGYL) 500 MG tablet       montelukast (SINGULAIR) 10 mg tablet Take 1 tablet (10 mg total) by mouth once daily. 90 tablet 2    rosuvastatin (CRESTOR) 10 MG tablet Take 1 tablet (10 mg total) by mouth once daily. 90 tablet 2       Physical Exam:  Vital Signs: There were no vitals filed for this visit.  General Appearance: Well appearing in no acute distress  ENT: OP clear  Chest: CTA B  CV: RRR, no m/r/g  Abd: s/nt/nd/nabs  Ext: no edema    Labs:Reviewed    IMP:  Active Hospital Problems    Diagnosis  POA    *Encounter for screening colonoscopy [Z12.11]  Not Applicable      Resolved Hospital Problems   No resolved problems to display.         Plan:   I have explained the risks and benefits of colonoscopy to the patient including but not limited to bleeding, perforation, infection, and death. The patient wishes to proceed.

## 2024-02-19 NOTE — [employer] PATIENT INSTRUCTIONS
Discharge Summary/Instructions after an Endoscopic Procedure  Patient Name: Bessy Egan  Patient MRN: [id number]  Patient YOB: 1975 Monday, February 19, 2024  Julissa Dupree MD  Dear patient,  As a result of recent federal legislation (The Federal Cures Act), you may   receive lab or pathology results from your procedure in your [facility]   account before your physician is able to contact you. Your physician or   their representative will relay the results to you with their   recommendations at their soonest availability.  Thank you,  RESTRICTIONS:  During your procedure today, you received medications for sedation.  These   medications may affect your judgment, balance and coordination.  Therefore,   for 24 hours, you have the following restrictions:   - DO NOT drive a car, operate machinery, make legal/financial decisions,   sign important papers or drink alcohol.    ACTIVITY:  Today: no heavy lifting, straining or running due to procedural   sedation/anesthesia.  The following day: return to full activity including work.  DIET:  Eat and drink normally unless instructed otherwise.     TREATMENT FOR COMMON SIDE EFFECTS:  - Mild abdominal pain, nausea, belching, bloating or excessive gas:  rest,   eat lightly and use a heating pad.  - Sore Throat: treat with throat lozenges and/or gargle with warm salt   water.  - Because air was used during the procedure, expelling large amounts of air   from your rectum or belching is normal.  - If a bowel prep was taken, you may not have a bowel movement for 1-3 days.    This is normal.  SYMPTOMS TO WATCH FOR AND REPORT TO YOUR PHYSICIAN:  1. Abdominal pain or bloating, other than gas cramps.  2. Chest pain.  3. Back pain.  4. Signs of infection such as: chills or fever occurring within 24 hours   after the procedure.  5. Rectal bleeding, which would show as bright red, maroon, or black stools.   (A tablespoon of blood from the rectum is not serious, especially  if   hemorrhoids are present.)  6. Vomiting.  7. Weakness or dizziness.  GO DIRECTLY TO THE NEAREST EMERGENCY ROOM IF YOU HAVE ANY OF THE FOLLOWING:      Difficulty breathing              Chills and/or fever over 101 F   Persistent vomiting and/or vomiting blood   Severe abdominal pain   Severe chest pain   Black, tarry stools   Bleeding- more than one tablespoon   Any other symptom or condition that you feel may need urgent attention  Your doctor recommends these additional instructions:  If any biopsies were taken, your doctors clinic will contact you in 1 to 2   weeks with any results.  - Discharge patient to home (via wheelchair).   - Resume previous diet.   - Continue present medications.   - Await pathology results.   - Repeat colonoscopy in 3 years for surveillance.   - Patient has a contact number available for emergencies.  The signs and   symptoms of potential delayed complications were discussed with the   patient.  Return to normal activities tomorrow.  Written discharge   instructions were provided to the patient.  For questions, problems or results please call your physician Julissa Dupree MD at Work:  [phone]  If you have any questions about the above instructions, call the GI   department at [phone] or call the endoscopy unit at [phone]   from 7am until 3 pm.  [facility], EMERGENCY ROOM PHONE NUMBER:   [phone]  IF A COMPLICATION OR EMERGENCY SITUATION ARISES AND YOU ARE UNABLE TO REACH   YOUR PHYSICIAN - GO DIRECTLY TO THE EMERGENCY ROOM.  I have read or have had read to me these discharge instructions for my   procedure and have received a written copy.  I understand these   instructions and will follow-up with my physician if I have any questions.     __________________________________       _____________________________________  Nurse Signature                                          Patient/Designated   Responsible Party  Signature  MD Julissa Nolan MD  2/19/2024 8:13:08 AM  [employer]

## 2024-02-19 NOTE — ANESTHESIA POSTPROCEDURE EVALUATION
Anesthesia Post Evaluation    Patient: Bessy Egan    Procedure(s) Performed: Procedure(s) (LRB):  COLONOSCOPY (N/A)    Final Anesthesia Type: general      Patient location during evaluation: PACU  Patient participation: Yes- Able to Participate  Level of consciousness: awake  Post-procedure vital signs: reviewed and stable  Pain management: adequate  Airway patency: patent    PONV status at discharge: No PONV  Anesthetic complications: no      Cardiovascular status: stable  Respiratory status: unassisted  Hydration status: euvolemic  Follow-up not needed.              Vitals Value Taken Time   /85 02/19/24 0833   Temp 36.1 °C (97 °F) 02/19/24 0813   Pulse 87 02/19/24 0833   Resp 16 02/19/24 0833   SpO2 99 % 02/19/24 0833         Event Time   Out of Recovery 08:39:27         Pain/Jonathan Score: Jonathan Score: 10 (2/19/2024  8:33 AM)

## 2024-02-20 ENCOUNTER — CLINICAL SUPPORT (OUTPATIENT)
Dept: SMOKING CESSATION | Facility: CLINIC | Age: 49
End: 2024-02-20
Payer: COMMERCIAL

## 2024-02-20 ENCOUNTER — ANESTHESIA EVENT (OUTPATIENT)
Dept: ENDOSCOPY | Facility: HOSPITAL | Age: 49
End: 2024-02-20
Payer: COMMERCIAL

## 2024-02-20 ENCOUNTER — ANESTHESIA (OUTPATIENT)
Dept: ENDOSCOPY | Facility: HOSPITAL | Age: 49
End: 2024-02-20
Payer: COMMERCIAL

## 2024-02-20 DIAGNOSIS — F17.200 NICOTINE DEPENDENCE: Primary | ICD-10-CM

## 2024-02-20 PROBLEM — I10 HYPERTENSION: Status: ACTIVE | Noted: 2024-02-20

## 2024-02-20 PROBLEM — E78.5 HLD (HYPERLIPIDEMIA): Status: ACTIVE | Noted: 2024-02-20

## 2024-02-20 PROBLEM — F41.9 ANXIETY: Status: ACTIVE | Noted: 2024-02-20

## 2024-02-20 LAB
ALBUMIN SERPL BCP-MCNC: 3.6 G/DL (ref 3.5–5.2)
ALP SERPL-CCNC: 96 U/L (ref 55–135)
ALT SERPL W/O P-5'-P-CCNC: 18 U/L (ref 10–44)
ANION GAP SERPL CALC-SCNC: 9 MMOL/L (ref 8–16)
AST SERPL-CCNC: 16 U/L (ref 10–40)
BASOPHILS # BLD AUTO: 0.05 K/UL (ref 0–0.2)
BASOPHILS NFR BLD: 0.5 % (ref 0–1.9)
BILIRUB SERPL-MCNC: 0.5 MG/DL (ref 0.1–1)
BUN SERPL-MCNC: 7 MG/DL (ref 6–20)
CALCIUM SERPL-MCNC: 8.8 MG/DL (ref 8.7–10.5)
CHLORIDE SERPL-SCNC: 107 MMOL/L (ref 95–110)
CO2 SERPL-SCNC: 23 MMOL/L (ref 23–29)
CREAT SERPL-MCNC: 0.8 MG/DL (ref 0.5–1.4)
DIFFERENTIAL METHOD BLD: ABNORMAL
EOSINOPHIL # BLD AUTO: 0.1 K/UL (ref 0–0.5)
EOSINOPHIL NFR BLD: 0.5 % (ref 0–8)
ERYTHROCYTE [DISTWIDTH] IN BLOOD BY AUTOMATED COUNT: 16.4 % (ref 11.5–14.5)
EST. GFR  (NO RACE VARIABLE): >60 ML/MIN/1.73 M^2
GLUCOSE SERPL-MCNC: 110 MG/DL (ref 70–110)
HCT VFR BLD AUTO: 35.7 % (ref 37–48.5)
HGB BLD-MCNC: 11.3 G/DL (ref 12–16)
IMM GRANULOCYTES # BLD AUTO: 0.03 K/UL (ref 0–0.04)
IMM GRANULOCYTES NFR BLD AUTO: 0.3 % (ref 0–0.5)
LYMPHOCYTES # BLD AUTO: 2 K/UL (ref 1–4.8)
LYMPHOCYTES NFR BLD: 19.9 % (ref 18–48)
MCH RBC QN AUTO: 27.1 PG (ref 27–31)
MCHC RBC AUTO-ENTMCNC: 31.7 G/DL (ref 32–36)
MCV RBC AUTO: 86 FL (ref 82–98)
MONOCYTES # BLD AUTO: 0.6 K/UL (ref 0.3–1)
MONOCYTES NFR BLD: 6.3 % (ref 4–15)
NEUTROPHILS # BLD AUTO: 7.3 K/UL (ref 1.8–7.7)
NEUTROPHILS NFR BLD: 72.5 % (ref 38–73)
NRBC BLD-RTO: 0 /100 WBC
OHS QRS DURATION: 92 MS
OHS QTC CALCULATION: 432 MS
PLATELET # BLD AUTO: 298 K/UL (ref 150–450)
PMV BLD AUTO: 10.3 FL (ref 9.2–12.9)
POTASSIUM SERPL-SCNC: 4.2 MMOL/L (ref 3.5–5.1)
PROT SERPL-MCNC: 6 G/DL (ref 6–8.4)
RBC # BLD AUTO: 4.17 M/UL (ref 4–5.4)
SODIUM SERPL-SCNC: 139 MMOL/L (ref 136–145)
WBC # BLD AUTO: 10.09 K/UL (ref 3.9–12.7)

## 2024-02-20 PROCEDURE — 37000009 HC ANESTHESIA EA ADD 15 MINS: Performed by: INTERNAL MEDICINE

## 2024-02-20 PROCEDURE — 25000003 PHARM REV CODE 250: Performed by: HOSPITALIST

## 2024-02-20 PROCEDURE — 25000003 PHARM REV CODE 250: Performed by: INTERNAL MEDICINE

## 2024-02-20 PROCEDURE — 99406 BEHAV CHNG SMOKING 3-10 MIN: CPT | Mod: S$GLB,,,

## 2024-02-20 PROCEDURE — 27200997: Performed by: INTERNAL MEDICINE

## 2024-02-20 PROCEDURE — 88305 TISSUE EXAM BY PATHOLOGIST: CPT | Mod: 26,,, | Performed by: STUDENT IN AN ORGANIZED HEALTH CARE EDUCATION/TRAINING PROGRAM

## 2024-02-20 PROCEDURE — 27201089 HC SNARE, DISP (ANY): Performed by: INTERNAL MEDICINE

## 2024-02-20 PROCEDURE — 27202363 HC INJECTION AGENT, SUBMUCOSAL, ANY: Performed by: INTERNAL MEDICINE

## 2024-02-20 PROCEDURE — 25000003 PHARM REV CODE 250: Performed by: STUDENT IN AN ORGANIZED HEALTH CARE EDUCATION/TRAINING PROGRAM

## 2024-02-20 PROCEDURE — 27201028 HC NEEDLE, SCLERO: Performed by: INTERNAL MEDICINE

## 2024-02-20 PROCEDURE — 85025 COMPLETE CBC W/AUTO DIFF WBC: CPT | Performed by: HOSPITALIST

## 2024-02-20 PROCEDURE — G0378 HOSPITAL OBSERVATION PER HR: HCPCS

## 2024-02-20 PROCEDURE — 37000008 HC ANESTHESIA 1ST 15 MINUTES: Performed by: INTERNAL MEDICINE

## 2024-02-20 PROCEDURE — 88305 TISSUE EXAM BY PATHOLOGIST: CPT | Performed by: STUDENT IN AN ORGANIZED HEALTH CARE EDUCATION/TRAINING PROGRAM

## 2024-02-20 PROCEDURE — 80053 COMPREHEN METABOLIC PANEL: CPT | Performed by: HOSPITALIST

## 2024-02-20 PROCEDURE — 45381 COLONOSCOPY SUBMUCOUS NJX: CPT | Mod: ,,, | Performed by: INTERNAL MEDICINE

## 2024-02-20 PROCEDURE — 45381 COLONOSCOPY SUBMUCOUS NJX: CPT | Performed by: INTERNAL MEDICINE

## 2024-02-20 PROCEDURE — 27201042 HC RETRIEVAL NET: Performed by: INTERNAL MEDICINE

## 2024-02-20 PROCEDURE — 63600175 PHARM REV CODE 636 W HCPCS: Performed by: INTERNAL MEDICINE

## 2024-02-20 PROCEDURE — 36415 COLL VENOUS BLD VENIPUNCTURE: CPT | Performed by: HOSPITALIST

## 2024-02-20 PROCEDURE — 63600175 PHARM REV CODE 636 W HCPCS: Performed by: STUDENT IN AN ORGANIZED HEALTH CARE EDUCATION/TRAINING PROGRAM

## 2024-02-20 PROCEDURE — 25000003 PHARM REV CODE 250: Performed by: EMERGENCY MEDICINE

## 2024-02-20 PROCEDURE — 99205 OFFICE O/P NEW HI 60 MIN: CPT | Mod: 25,,, | Performed by: INTERNAL MEDICINE

## 2024-02-20 RX ORDER — LIDOCAINE HYDROCHLORIDE 10 MG/ML
INJECTION, SOLUTION EPIDURAL; INFILTRATION; INTRACAUDAL; PERINEURAL
Status: DISCONTINUED | OUTPATIENT
Start: 2024-02-20 | End: 2024-02-20

## 2024-02-20 RX ORDER — DEXTROMETHORPHAN/PSEUDOEPHED 2.5-7.5/.8
DROPS ORAL
Status: DISCONTINUED | OUTPATIENT
Start: 2024-02-20 | End: 2024-02-20 | Stop reason: HOSPADM

## 2024-02-20 RX ORDER — EPINEPHRINE 1 MG/ML
INJECTION, SOLUTION, CONCENTRATE INTRAVENOUS
Status: DISCONTINUED | OUTPATIENT
Start: 2024-02-20 | End: 2024-02-20 | Stop reason: HOSPADM

## 2024-02-20 RX ORDER — PROPOFOL 10 MG/ML
VIAL (ML) INTRAVENOUS
Status: DISCONTINUED | OUTPATIENT
Start: 2024-02-20 | End: 2024-02-20

## 2024-02-20 RX ORDER — SODIUM CHLORIDE, SODIUM LACTATE, POTASSIUM CHLORIDE, CALCIUM CHLORIDE 600; 310; 30; 20 MG/100ML; MG/100ML; MG/100ML; MG/100ML
INJECTION, SOLUTION INTRAVENOUS CONTINUOUS PRN
Status: DISCONTINUED | OUTPATIENT
Start: 2024-02-20 | End: 2024-02-20

## 2024-02-20 RX ADMIN — PROPOFOL 50 MG: 10 INJECTION, EMULSION INTRAVENOUS at 07:02

## 2024-02-20 RX ADMIN — LOSARTAN POTASSIUM 50 MG: 50 TABLET, FILM COATED ORAL at 08:02

## 2024-02-20 RX ADMIN — MAGNESIUM CITRATE 296 ML: 1.75 LIQUID ORAL at 12:02

## 2024-02-20 RX ADMIN — AMLODIPINE BESYLATE 5 MG: 5 TABLET ORAL at 08:02

## 2024-02-20 RX ADMIN — SODIUM CHLORIDE, SODIUM LACTATE, POTASSIUM CHLORIDE, AND CALCIUM CHLORIDE: 600; 310; 30; 20 INJECTION, SOLUTION INTRAVENOUS at 06:02

## 2024-02-20 RX ADMIN — PROPOFOL 100 MG: 10 INJECTION, EMULSION INTRAVENOUS at 07:02

## 2024-02-20 RX ADMIN — LIDOCAINE HYDROCHLORIDE 50 MG: 10 SOLUTION INTRAVENOUS at 06:02

## 2024-02-20 RX ADMIN — ATORVASTATIN CALCIUM 10 MG: 10 TABLET, FILM COATED ORAL at 08:02

## 2024-02-20 NOTE — BRIEF OP NOTE
Inpatient Endoscopy Brief Operative Note      Admit Date: 2/19/2024    Procedure Date and Time:  2/20/2024 7:35 AM    Attending Physician: Lavern New,*     Principal Admitting Diagnoses: GI bleed         Discharge Diagnosis: The primary encounter diagnosis was S/P colonoscopic polypectomy. A diagnosis of Chest pain was also pertinent to this visit.     Discharged Condition: Stable    Indication for Admission: GI bleed     Procedure Performed: Colonoscopy with polypectomy and control of hemorrhage    SEE [employer] REPORTS FOR DETAILS.    Pathology (if any):  Specimen (24h ago, onward)       Start     Ordered    02/20/24 0726  Specimen to Pathology, Surgery Gastrointestinal tract  Once        Comments: Pre-op Diagnosis: S/P colonoscopic polypectomy [Z98.890]Procedure(s):COLONOSCOPY Number of specimens: 1Name of specimens: 1 hepatic flexure polypectomy stalk-PLEASE COMMENT ON MARGINS     References:    Click here for ordering Quick Tip   Question Answer Comment   Procedure Type: Gastrointestinal tract    Which provider would you like to cc? BREANN HARRELL    Release to patient Immediate        02/20/24 0733                    Estimated Blood Loss: 2 ml.    Discussed with: patient.    Disposition: Return to hospital [facility].    Recommendations:   Clear liquid diet, ADAT  Ok to discharge home  No NSAIDs including ASA  No heavy lifting for next 3 weeks        Communicated with hospital medicine service regarding the above findings.       Breann Harrell MD  Inpatient Gastroenterology  [facility]

## 2024-02-20 NOTE — [employer]ATION PATIENT INSTRUCTIONS
Discharge Summary/Instructions after an Endoscopic Procedure  Patient Name: Bessy Egan  Patient MRN: [id number]  Patient YOB: 1975 Tuesday, February 20, 2024 Breann Harrell MD  Dear patient,  As a result of recent federal legislation (The Federal Cures Act), you may   receive lab or pathology results from your procedure in your [facility]   account before your physician is able to contact you. Your physician or   their representative will relay the results to you with their   recommendations at their soonest availability.  Thank you,  RESTRICTIONS:  During your procedure today, you received medications for sedation.  These   medications may affect your judgment, balance and coordination.  Therefore,   for 24 hours, you have the following restrictions:   - DO NOT drive a car, operate machinery, make legal/financial decisions,   sign important papers or drink alcohol.    ACTIVITY:  Today: no heavy lifting, straining or running due to procedural   sedation/anesthesia.  The following day: return to full activity including work.  DIET:  Eat and drink normally unless instructed otherwise.     TREATMENT FOR COMMON SIDE EFFECTS:  - Mild abdominal pain, nausea, belching, bloating or excessive gas:  rest,   eat lightly and use a heating pad.  - Sore Throat: treat with throat lozenges and/or gargle with warm salt   water.  - Because air was used during the procedure, expelling large amounts of air   from your rectum or belching is normal.  - If a bowel prep was taken, you may not have a bowel movement for 1-3 days.    This is normal.  SYMPTOMS TO WATCH FOR AND REPORT TO YOUR PHYSICIAN:  1. Abdominal pain or bloating, other than gas cramps.  2. Chest pain.  3. Back pain.  4. Signs of infection such as: chills or fever occurring within 24 hours   after the procedure.  5. Rectal bleeding, which would show as bright red, maroon, or black stools.   (A tablespoon of blood from the rectum is not serious, especially if    hemorrhoids are present.)  6. Vomiting.  7. Weakness or dizziness.  GO DIRECTLY TO THE NEAREST EMERGENCY ROOM IF YOU HAVE ANY OF THE FOLLOWING:      Difficulty breathing              Chills and/or fever over 101 F   Persistent vomiting and/or vomiting blood   Severe abdominal pain   Severe chest pain   Black, tarry stools   Bleeding- more than one tablespoon   Any other symptom or condition that you feel may need urgent attention  Your doctor recommends these additional instructions:  If any biopsies were taken, your doctors clinic will contact you in 1 to 2   weeks with any results.  - Discharge patient to home (with escort).   - Clear liquid diet.   - No aspirin, ibuprofen, naproxen, or other non-steroidal anti-inflammatory   drugs for 3 weeks.   - Try to avoid heavy lifting. No more than 20 lbs for 3 weeks.   - The findings and recommendations were discussed with the patient.   - Communicated with hospital medicine service regarding findings.  - Repeat colonoscopy in 3 years for surveillance based on pathology   results.  For questions, problems or results please call your physician Breann Harrell MD at Work:  [phone]  If you have any questions about the above instructions, call the GI   department at [phone] or call the endoscopy unit at [phone]   from 7am until 3 pm.  [facility], EMERGENCY ROOM PHONE NUMBER:   [phone]  IF A COMPLICATION OR EMERGENCY SITUATION ARISES AND YOU ARE UNABLE TO REACH   YOUR PHYSICIAN - GO DIRECTLY TO THE EMERGENCY ROOM.  I have read or have had read to me these discharge instructions for my   procedure and have received a written copy.  I understand these   instructions and will follow-up with my physician if I have any questions.     __________________________________       _____________________________________  Nurse Signature                                          Patient/Designated   Responsible Party Signature  Breann  MD Breann Harrell MD  2/20/2024 7:44:49 AM  This report has been verified and signed electronically.  Dear patient,  As a result of recent federal legislation (The Federal Cures Act), you may   receive lab or pathology results from your procedure in your [facility]   account before your physician is able to contact you. Your physician or   their representative will relay the results to you with their   recommendations at their soonest availability.  Thank you,  [employer]

## 2024-02-20 NOTE — FIRST PROVIDER EVALUATION
Medical screening examination initiated.  I have conducted a focused provider triage encounter, findings are as follows:    Brief history of present illness:  Patient presents with continued rectal bleeding after colonoscopy this morning.    There were no vitals filed for this visit.    Pertinent physical exam:  No acute distress noted    Brief workup plan:  Labs, imaging    Preliminary workup initiated; this workup will be continued and followed by the physician or advanced practice provider that is assigned to the patient when roomed.

## 2024-02-20 NOTE — INTERVAL H&P NOTE
The patient has been examined and the H&P has been reviewed:    I concur with the findings and no changes have occurred since H&P was written.    Anesthesia/Surgery risks, benefits and alternative options discussed and understood by patient/family.        The risks, benefits and alternatives of the procedure were discussed with the patient in detail. This discussion was had in the presence of endoscopy nursing staff. The risks include, risks of adverse reaction to sedation requiring the use of reversal agents, bleeding requiring blood transfusion, perforation requiring surgical intervention and technical failure. Other risks include aspiration leading to respiratory distress and respiratory failure resulting in endotracheal intubation and mechanical ventilation including death. If anesthesia is being utilized for this procedure, it is up to the anesthesiologist to determine airway safety including elective endotracheal intubation. Questions were answered, they agree to proceed. There was no language barriers.    Active Hospital Problems    Diagnosis  POA    *GI bleed [K92.2]  Yes     Priority: 1 - High    Hypertension [I10]  Yes    Anxiety [F41.9]  Yes    HLD (hyperlipidemia) [E78.5]  Yes    Syncope [R55]  Yes      Resolved Hospital Problems   No resolved problems to display.

## 2024-02-20 NOTE — ASSESSMENT & PLAN NOTE
Chronic, controlled. Latest blood pressure and vitals reviewed-     Temp:  [96.8 °F (36 °C)-98.5 °F (36.9 °C)]   Pulse:  []   Resp:  [15-20]   BP: (109-160)/(72-95)   SpO2:  [98 %-100 %] .   Home meds for hypertension were reviewed and noted below.   Hypertension Medications               amlodipine-olmesartan (ZACKARY) 5-20 mg per tablet Take 1 tablet by mouth once daily.     While in the hospital, will manage blood pressure as follows; Continue home antihypertensive regimen    Will utilize p.r.n. blood pressure medication only if patient's blood pressure greater than 160/100 and she develops symptoms such as worsening chest pain or shortness of breath.

## 2024-02-20 NOTE — SUBJECTIVE & OBJECTIVE
Past Medical History:   Diagnosis Date    Anxiety disorder, unspecified     Hypertension     Kidney stones 01/23/2023       Past Surgical History:   Procedure Laterality Date    BREAST BIOPSY Left 2018    COLONOSCOPY N/A 2/19/2024    Procedure: COLONOSCOPY;  Surgeon: Julissa Dupree MD;  Location: [facility];  Service: Endoscopy;  Laterality: N/A;    HERNIA REPAIR      kidney stones         Review of patient's allergies indicates:   Allergen Reactions    Ace inhibitors Anaphylaxis, Shortness Of Breath and Swelling     Other reaction(s): Reaction:Facial swelling; Alternate Reaction: Anaphylaxis;     Family History       Problem Relation (Age of Onset)    Diabetes Mother, Maternal Grandmother, Maternal Grandfather          Tobacco Use    Smoking status: Some Days     Types: Cigarettes    Smokeless tobacco: Never   Substance and Sexual Activity    Alcohol use: No    Drug use: No    Sexual activity: Yes     Review of Systems   Respiratory:  Negative for shortness of breath.    Cardiovascular:  Negative for chest pain.   Gastrointestinal:  Positive for blood in stool. Negative for abdominal pain.   All other systems reviewed and are negative.    Objective:     Vital Signs (Most Recent):  Temp: 98.6 °F (37 °C) (02/20/24 0438)  Pulse: 78 (02/20/24 0438)  Resp: 18 (02/20/24 0438)  BP: 102/68 (02/20/24 0439)  SpO2: 99 % (02/20/24 0438) Vital Signs (24h Range):  Temp:  [96.8 °F (36 °C)-98.6 °F (37 °C)] 98.6 °F (37 °C)  Pulse:  [] 78  Resp:  [15-20] 18  SpO2:  [98 %-100 %] 99 %  BP: (102-160)/(68-95) 102/68     Weight: 118 kg (260 lb 2.3 oz) (02/20/24 0438)  Body mass index is 38.42 kg/m².    No intake or output data in the 24 hours ending 02/20/24 0632    Lines/Drains/Airways       Peripheral Intravenous Line  Duration                  Peripheral IV - Single Lumen 02/19/24 2010 20 G Left Antecubital <1 day                     Physical Exam  Vitals and nursing note reviewed.   Constitutional:       Appearance: She is  "overweight.   Neurological:      Mental Status: She is alert.          Significant Labs:  CBC:   Recent Labs   Lab 02/19/24 2001 02/20/24 0422   WBC 6.03 10.09   HGB 13.1 11.3*   HCT 40.7 35.7*    298     CMP:   Recent Labs   Lab 02/20/24 0421      CALCIUM 8.8   ALBUMIN 3.6   PROT 6.0      K 4.2   CO2 23      BUN 7   CREATININE 0.8   ALKPHOS 96   ALT 18   AST 16   BILITOT 0.5     Coagulation: No results for input(s): "PT", "INR", "APTT" in the last 48 hours.    Significant Imaging:  Imaging results within the past 24 hours have been reviewed.  "

## 2024-02-20 NOTE — PLAN OF CARE
[facility] - Med Surg  Discharge Final Note    Primary Care Provider: Shobha Mendes MD    Expected Discharge Date: 2/20/2024    Final Discharge Note (most recent)       Final Note - 02/20/24 0901          Final Note    Assessment Type Final Discharge Note     Anticipated Discharge Disposition Home or Self Care     Hospital Resources/Appts/Education Provided Appointments scheduled and added to AVS        Post-Acute Status    Discharge Delays None known at this time                   Contact Info       Shobha Mendes MD   Specialty: Family Medicine   Relationship: PCP - General    [address] [facility] [place] [address]   Phone: [phone]       Next Steps: Follow up in 1 week(s)    Julissa Dupree MD   Specialty: Gastroenterology, Internal Medicine    [address] [place] [address] [address] [address]   Phone: [phone]       Next Steps: Follow up in 2 week(s)          No d/c needs/orders at this time.

## 2024-02-20 NOTE — PLAN OF CARE
Problem: Adult Inpatient Plan of Care  Goal: Plan of Care Review  2/20/2024 0132 by Georgia Flores LPN  Outcome: Ongoing, Progressing  2/20/2024 0132 by Georgia Flores LPN  Outcome: Ongoing, Progressing  Goal: Patient-Specific Goal (Individualized)  2/20/2024 0132 by Georgia Flores LPN  Outcome: Ongoing, Progressing  2/20/2024 0132 by Georgia Flores LPN  Outcome: Ongoing, Progressing  Goal: Absence of Hospital-Acquired Illness or Injury  2/20/2024 0132 by Georgia Flores LPN  Outcome: Ongoing, Progressing  2/20/2024 0132 by Georgia Flores LPN  Outcome: Ongoing, Progressing  Goal: Optimal Comfort and Wellbeing  2/20/2024 0132 by Georgia Flores LPN  Outcome: Ongoing, Progressing  2/20/2024 0132 by Georgia Flores LPN  Outcome: Ongoing, Progressing  Goal: Readiness for Transition of Care  2/20/2024 0132 by Georgia Flores LPN  Outcome: Ongoing, Progressing  2/20/2024 0132 by Georgia Flores LPN  Outcome: Ongoing, Progressing

## 2024-02-20 NOTE — ANESTHESIA PREPROCEDURE EVALUATION
02/20/2024  Bessy Egan is a 48 y.o., female.    Past Medical History:   Diagnosis Date    Anxiety disorder, unspecified     Hypertension     Kidney stones 01/23/2023     Past Surgical History:   Procedure Laterality Date    BREAST BIOPSY Left 2018    COLONOSCOPY N/A 2/19/2024    Procedure: COLONOSCOPY;  Surgeon: Julissa Dupree MD;  Location: [facility];  Service: Endoscopy;  Laterality: N/A;    HERNIA REPAIR      kidney stones       Patient Active Problem List   Diagnosis    Encounter for screening colonoscopy    GI bleed    Syncope    Hypertension    Anxiety    HLD (hyperlipidemia)         Pre-op Assessment    I have reviewed the Patient Summary Reports.     I have reviewed the Nursing Notes. I have reviewed the NPO Status.   I have reviewed the Medications.     Review of Systems  Anesthesia Hx:  No problems with previous Anesthesia             Denies Family Hx of Anesthesia complications.    Denies Personal Hx of Anesthesia complications.                    Social:  Non-Smoker, No Alcohol Use       Hematology/Oncology:  Hematology Normal   Oncology Normal                                   EENT/Dental:  EENT/Dental Normal           Cardiovascular:     Hypertension                                        Pulmonary:  Pulmonary Normal                       Renal/:  Chronic Renal Disease renal calculi               Hepatic/GI:  Bowel Prep.                Musculoskeletal:  Musculoskeletal Normal                Neurological:  Neurology Normal                                      Endocrine:  Endocrine Normal            Dermatological:  Skin Normal    Psych:  Psychiatric Normal                    Physical Exam  General: Well nourished, Cooperative, Alert and Oriented    Airway:  Mallampati: II   Mouth Opening: Normal  TM Distance: Normal  Tongue: Normal  Neck ROM: Normal  ROM    Dental:  Intact    Chest/Lungs:  Normal Respiratory Rate        Anesthesia Plan  Type of Anesthesia, risks & benefits discussed:    Anesthesia Type: Gen Natural Airway  Intra-op Monitoring Plan: Standard ASA Monitors  Post Op Pain Control Plan: multimodal analgesia  Induction:  IV  Informed Consent: Informed consent signed with the Patient and all parties understand the risks and agree with anesthesia plan.  All questions answered. Patient consented to blood products? No  ASA Score: 2  Day of Surgery Review of History & Physical: H&P Update referred to the surgeon/provider.    Ready For Surgery From Anesthesia Perspective.     .

## 2024-02-20 NOTE — H&P
"  [facility] - Emergency Dept.  [facility] Medicine  History & Physical    Patient Name: Bessy Egan  MRN: [id number]  Patient Class: OP- Observation  Admission Date: 2/19/2024  Attending Physician: Rolando Osorio MD   Primary Care Provider: Shobha Mendes MD         Patient information was obtained from patient, past medical records, and ER records.     Subjective:     Principal Problem:GI bleed    Chief Complaint:   Chief Complaint   Patient presents with    Rectal Bleeding     Rectal bleeding since colonoscopy at [facility] this AM. First bowel movement was at 1500 today and has been increasing since with gas. Reports having polyps removed with clips placed. Associated abd "aching" and bloating. Denies being on blood thinners        HPI: Bessy Egan is a 48 y.o. female with a PMH  has a past medical history of Anxiety disorder, unspecified, Hypertension, and Kidney stones (01/23/2023). who presented to the ED for further evaluation following multiple bright red bloody bowel movements since 3 p.m. Patient recently underwent colonoscopy with polypectomy by Dr. Dupree at [facility] earlier today and was doing well until onset of symptoms. Patient reported have multiple non-painful bright red bloody bowel movements after returning home from her colonoscopy making her concerned and prompting her to come to the ED to be further evaluated. She denied taking any antiplatelet or anticoagulants and stated she only ate gumbo for lunch. While in the ED, patient reported having bloody bowel movement and suffered a syncope event in triage while have her blood drawn followed seizure like activity. She denied prior history of syncope events or known seizures and reported feeling flushed, diaphoretic, nauseous, and palpitations prior to passing out. Prior to onset symptoms, patient reported being in her usual state of health with no other concerns or complaints.  All other review of systems negative except as " noted above.  Initial workup in the ED fairly unremarkable.  Patient remains hemodynamically stable, H/H 13.1/40.7, CTA negative for active GI bleeding but positive for submucosal hyperenhancement around clips. GI consulted by ED staff who recommended bowel prep and admission to medicine with repeat colonoscopy tomorrow.  Patient admitted to Hospital Medicine under observation for continued medical management.       PCP: Shobha Mendes      Past Medical History:   Diagnosis Date    Hypertension     Kidney stones 01/23/2023       Past Surgical History:   Procedure Laterality Date    BREAST BIOPSY Left 2018    HERNIA REPAIR      kidney stones         Review of patient's allergies indicates:   Allergen Reactions    Ace inhibitors Anaphylaxis, Shortness Of Breath and Swelling     Other reaction(s): Reaction:Facial swelling; Alternate Reaction: Anaphylaxis;       Current Facility-Administered Medications on File Prior to Encounter   Medication    [COMPLETED] simethicone 40 mg/0.6 mL drops    [DISCONTINUED] lactated ringers infusion    [DISCONTINUED] LIDOcaine (PF) 20 mg/ml (2%) injection    [DISCONTINUED] propofol (DIPRIVAN) 10 mg/mL infusion     Current Outpatient Medications on File Prior to Encounter   Medication Sig    ALPRAZolam (XANAX) 2 MG Tab Take 1 tablet (2 mg total) by mouth 2 (two) times daily as needed (anxiety).    amlodipine-olmesartan (ZACKARY) 5-20 mg per tablet Take 1 tablet by mouth once daily.    buPROPion (WELLBUTRIN XL) 150 MG TB24 tablet Take 150 mg by mouth once daily.    fluconazole (DIFLUCAN) 200 MG Tab 1 po bi-weekly x 2 weeks    ketoconazole 2 % Foam AAA bid prn tinea versicolor (Patient not taking: Reported on 3/27/2023)    levocetirizine (XYZAL) 5 MG tablet Take 1 tablet (5 mg total) by mouth every evening.    metroNIDAZOLE (FLAGYL) 500 MG tablet     montelukast (SINGULAIR) 10 mg tablet Take 1 tablet (10 mg total) by mouth once daily.    rosuvastatin (CRESTOR) 10 MG tablet Take 1  tablet (10 mg total) by mouth once daily.     Family History       Problem Relation (Age of Onset)    Diabetes Mother, Maternal Grandmother, Maternal Grandfather          Tobacco Use    Smoking status: Some Days     Types: Cigarettes    Smokeless tobacco: Never   Substance and Sexual Activity    Alcohol use: No    Drug use: No    Sexual activity: Yes     Review of Systems   All other systems reviewed and are negative.    Objective:     Vital Signs (Most Recent):  Temp: 98 °F (36.7 °C) (02/19/24 2015)  Pulse: 89 (02/19/24 2015)  Resp: 19 (02/19/24 2015)  BP: 109/75 (02/19/24 2015)  SpO2: 98 % (02/19/24 2015) Vital Signs (24h Range):  Temp:  [96.8 °F (36 °C)-98 °F (36.7 °C)] 98 °F (36.7 °C)  Pulse:  [] 89  Resp:  [15-20] 19  SpO2:  [98 %-100 %] 98 %  BP: (109-160)/(75-95) 109/75     Weight: 84.5 kg (186 lb 4.6 oz)  Body mass index is 27.51 kg/m².     Physical Exam  Vitals reviewed.   Constitutional:       General: She is not in acute distress.     Appearance: Normal appearance. She is normal weight. She is not ill-appearing, toxic-appearing or diaphoretic.   HENT:      Head: Normocephalic and atraumatic.      Right Ear: External ear normal.      Left Ear: External ear normal.      Nose: Nose normal. No congestion or rhinorrhea.      Mouth/Throat:      Mouth: Mucous membranes are moist.      Pharynx: Oropharynx is clear. No oropharyngeal exudate or posterior oropharyngeal erythema.   Eyes:      General: No scleral icterus.     Extraocular Movements: Extraocular movements intact.      Conjunctiva/sclera: Conjunctivae normal.      Pupils: Pupils are equal, round, and reactive to light.   Neck:      Vascular: No carotid bruit.   Cardiovascular:      Rate and Rhythm: Normal rate and regular rhythm.      Pulses: Normal pulses.      Heart sounds: Normal heart sounds. No murmur heard.     No friction rub. No gallop.   Pulmonary:      Effort: Pulmonary effort is normal. No respiratory distress.      Breath sounds: Normal  breath sounds. No stridor. No wheezing, rhonchi or rales.   Chest:      Chest wall: No tenderness.   Abdominal:      General: Abdomen is flat. Bowel sounds are normal. There is no distension.      Palpations: Abdomen is soft.      Tenderness: There is no abdominal tenderness. There is no right CVA tenderness, left CVA tenderness, guarding or rebound.      Hernia: No hernia is present.   Musculoskeletal:         General: No swelling, tenderness, deformity or signs of injury. Normal range of motion.      Cervical back: Normal range of motion and neck supple. No rigidity or tenderness.      Right lower leg: No edema.      Left lower leg: No edema.   Lymphadenopathy:      Cervical: No cervical adenopathy.   Skin:     General: Skin is warm and dry.      Capillary Refill: Capillary refill takes less than 2 seconds.      Coloration: Skin is not jaundiced or pale.      Findings: No bruising, erythema, lesion or rash.   Neurological:      General: No focal deficit present.      Mental Status: She is alert and oriented to person, place, and time. Mental status is at baseline.      Cranial Nerves: No cranial nerve deficit.      Sensory: No sensory deficit.      Motor: No weakness.      Coordination: Coordination normal.   Psychiatric:         Mood and Affect: Mood normal.         Behavior: Behavior normal.         Thought Content: Thought content normal.         Judgment: Judgment normal.              CRANIAL NERVES     CN III, IV, VI   Pupils are equal, round, and reactive to light.       Significant Labs: All pertinent labs within the past 24 hours have been reviewed.    Significant Imaging: I have reviewed all pertinent imaging results/findings within the past 24 hours.    LABS:  Recent Results (from the past 24 hour(s))   POCT urine pregnancy    Collection Time: 02/19/24  7:06 AM   Result Value Ref Range    POC Preg Test, Ur Negative Negative     Acceptable Yes    CBC auto differential    Collection Time:  02/19/24  8:01 PM   Result Value Ref Range    WBC 6.03 3.90 - 12.70 K/uL    RBC 4.76 4.00 - 5.40 M/uL    Hemoglobin 13.1 12.0 - 16.0 g/dL    Hematocrit 40.7 37.0 - 48.5 %    MCV 86 82 - 98 fL    MCH 27.5 27.0 - 31.0 pg    MCHC 32.2 32.0 - 36.0 g/dL    RDW 16.6 (H) 11.5 - 14.5 %    Platelets 340 150 - 450 K/uL    MPV 10.4 9.2 - 12.9 fL    Immature Granulocytes 0.3 0.0 - 0.5 %    Gran # (ANC) 3.4 1.8 - 7.7 K/uL    Immature Grans (Abs) 0.02 0.00 - 0.04 K/uL    Lymph # 2.0 1.0 - 4.8 K/uL    Mono # 0.5 0.3 - 1.0 K/uL    Eos # 0.1 0.0 - 0.5 K/uL    Baso # 0.05 0.00 - 0.20 K/uL    nRBC 0 0 /100 WBC    Gran % 55.7 38.0 - 73.0 %    Lymph % 33.7 18.0 - 48.0 %    Mono % 7.8 4.0 - 15.0 %    Eosinophil % 1.7 0.0 - 8.0 %    Basophil % 0.8 0.0 - 1.9 %    Differential Method Automated    Comprehensive metabolic panel    Collection Time: 02/19/24  8:01 PM   Result Value Ref Range    Sodium 141 136 - 145 mmol/L    Potassium 3.5 3.5 - 5.1 mmol/L    Chloride 106 95 - 110 mmol/L    CO2 22 (L) 23 - 29 mmol/L    Glucose 125 (H) 70 - 110 mg/dL    BUN 8 6 - 20 mg/dL    Creatinine 0.9 0.5 - 1.4 mg/dL    Calcium 9.1 8.7 - 10.5 mg/dL    Total Protein 7.0 6.0 - 8.4 g/dL    Albumin 3.9 3.5 - 5.2 g/dL    Total Bilirubin 0.5 0.1 - 1.0 mg/dL    Alkaline Phosphatase 110 55 - 135 U/L    AST 20 10 - 40 U/L    ALT 21 10 - 44 U/L    eGFR >60 >60 mL/min/1.73 m^2    Anion Gap 13 8 - 16 mmol/L       RADIOLOGY  CTA Acute GI Terrell, Abdomen and Pelvis    Result Date: 2/19/2024  EXAMINATION: CTA ACUTE GI BLEED, ABDOMEN AND PELVIS CLINICAL HISTORY: GI bleed;GI BLEED; TECHNIQUE: Low dose axial images, sagittal and coronal reformations were obtained from the lung bases to the pubic symphysis.  Contrast was not administered. COMPARISON: None FINDINGS: Heart: Normal in size. No pericardial effusion. Lung Bases: Well aerated, without consolidation or pleural fluid. Liver: Normal in size and attenuation, with no focal hepatic lesions. Gallbladder: No calcified  gallstones. Bile Ducts: No evidence of dilated ducts. Pancreas: No mass or peripancreatic fat stranding. Spleen: Unremarkable. Adrenals: Unremarkable. 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  Left kidney is better preserved. Bladder: No evidence of wall thickening. Reproductive organs: Uterine fibroids. GI Tract/Mesentery: No evidence of bowel obstruction or inflammation.  Clips in the hepatic flexure of the colon.  No definite ongoing arterial bleeding.  Diverticulosis without diverticulitis. No definite ongoing arterial GI bleeding.  Some mucosal hyperenhancement about the clips possibly postprocedural. Peritoneal Space: No ascites. No free air. Retroperitoneum: No significant adenopathy. Abdominal wall: Unremarkable. Vasculature: No significant atherosclerosis or aneurysm. Bones: No acute fracture.     No definite ongoing arterial GI bleeding. Submucosal hyperenhancement about the clips possibly postprocedural.  Correlation and further evaluation as warranted. 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 Complete findings as above. This report was flagged in Epic as abnormal. All CT scans at this facility are performed  using dose modulation techniques as appropriate to performed exam including the following:  automated exposure control; adjustment of mA and/or kV according to the patients size (this includes techniques or standardized protocols for targeted exams where dose is matched to indication/reason for exam: i.e. extremities or head);  iterative reconstruction technique. Electronically signed by: Ronan Starr Date:    02/19/2024 Time:    21:19    US Soft Tissue Chest_Upper Back    Result Date: 1/29/2024  EXAM: US SOFT TISSUE  CHEST_UPPER BACK CLINICAL HISTORY: Localized swelling, mass and lump, trauma FINDINGS: Dedicated sonographic evaluation of the palpable abnormality in the region of the left shoulder demonstrates a 5.9 x 1.0 x 5.0 cm isoechoic subcutaneous mass.  No associated internal vascularity.  Findings are most compatible with a subcutaneous lipoma.      As above. Finalized on: 1/29/2024 4:46 PM By:  Kody Hanna MD [facility]# [id number]      2024-01-29 16:[id number]    [facility]      EKG    MICROBIOLOGY    [facility]    Assessment/Plan:     * GI bleed  Patient presented with multiple bright red bloody bowel movements following recent colonoscopy with polypectomy s/p clipping earlier today by Dr. Dupree.  Initial workup in the ED fairly unremarkable.  Patient remains hemodynamically stable, H/H 13.1/40.7, CTA negative for active GI bleeding but positive for submucosal hyperenhancement around clips. GI consulted by ED staff who recommended bowel prep and admission to medicine with repeat colonoscopy tomorrow.    Plan:  -Mag citrate x1 per GI for bowel prep  -Clear liquid diet  -NPO @5am  -Continue current pain regimen, titrate as needed  -Bowel regimen  -Bedrest  -IVFs prn  -type/screen  -Monitor H/H, transfuse as needed  -Hold antiplatelet/anticoagulation therapies  -Antiemetics prn  -Tylenol as needed for fever   -f/u GI      Syncope  Patient endorsed syncope episode following multiple bloody bowel movements following colonoscopy with polypectomy.  Patient remains hemodynamically stable with H/H measuring 13.1/40.7 down from 15.2/48.9 back on 1/29/24.  Orthostatics not obtained in the ED. Patient admitted for postprocedural GI bleed as noted above.  Plan:  -Telemetry  -Monitor BP and H/H  -Continue treatment for GI bleed as noted above      Hypertension  Chronic, controlled. Latest blood pressure and vitals reviewed-     Temp:  [96.8 °F (36 °C)-98.5 °F (36.9 °C)]   Pulse:  []   Resp:  [15-20]   BP: (109-160)/(72-95)   SpO2:  [98 %-100  %] .   Home meds for hypertension were reviewed and noted below.   Hypertension Medications               amlodipine-olmesartan (ZACKARY) 5-20 mg per tablet Take 1 tablet by mouth once daily.     While in the hospital, will manage blood pressure as follows; Continue home antihypertensive regimen    Will utilize p.r.n. blood pressure medication only if patient's blood pressure greater than 160/100 and she develops symptoms such as worsening chest pain or shortness of breath.      Anxiety  Chronic. Stable. Not in acute exacerbation and currently denies endorsing any suicidal/homicidal ideations.   Plan:  -Continue home medications       HLD (hyperlipidemia)  Patient is chronically on statin.will continue for now. Last Lipid Panel:   Lab Results   Component Value Date    CHOL 216 (H) 01/29/2024    HDL 81 (H) 01/29/2024    LDLCALC 105.6 01/29/2024    TRIG 147 01/29/2024    CHOLHDL 37.5 01/29/2024   Plan:  -Continue home medication  -low fat/low calorie diet when not NPO        VTE Risk Mitigation (From admission, onward)           Ordered     Reason for No Pharmacological VTE Prophylaxis  Once        Question:  Reasons:  Answer:  Physician Provided (leave comment)  Comment:  pending colonoscopy in am    02/19/24 2142     IP VTE HIGH RISK PATIENT  Once         02/19/24 2142     Place sequential compression device  Until discontinued         02/19/24 2142                  //Core Measures   -DVT proph: SCDs, withholding anticoagulation pending surgical intervention and active GI bleeding  -Code status: Full    -Surrogate: none provided       Components of this note were documented using a voice recognition system and are subject to errors not corrected at the time the document was proof read. Please contact the author for any clarifications.       On 02/19/2024, patient should be placed in hospital observation services under my care.       Rolando Osorio MD  Department of Hospital Medicine  [facility] - Emergency  Dept.

## 2024-02-20 NOTE — ASSESSMENT & PLAN NOTE
Patient presented with multiple bright red bloody bowel movements following recent colonoscopy with polypectomy s/p clipping earlier today by Dr. Dupree.  Initial workup in the ED fairly unremarkable.  Patient remains hemodynamically stable, H/H 13.1/40.7, CTA negative for active GI bleeding but positive for submucosal hyperenhancement around clips. GI consulted by ED staff who recommended bowel prep and admission to medicine with repeat colonoscopy tomorrow.    Plan:  -Mag citrate x1 per GI for bowel prep  -Clear liquid diet  -NPO @5am  -Continue current pain regimen, titrate as needed  -Bowel regimen  -Bedrest  -IVFs prn  -type/screen  -Monitor H/H, transfuse as needed  -Hold antiplatelet/anticoagulation therapies  -Antiemetics prn  -Tylenol as needed for fever   -f/u GI

## 2024-02-20 NOTE — HOSPITAL COURSE
Bessy Egan is a 48 y.o. female with a PMH has a past medical history of Anxiety disorder, unspecified, Hypertension, and Kidney stones (01/23/2023). who presented to the ED for further evaluation following multiple bright red bloody bowel movements since 3 p.m. Patient recently underwent colonoscopy with polypectomy by Dr. Dupree at [facility] earlier today and was doing well until onset of symptoms. Patient reported have multiple non-painful bright red bloody bowel movements after returning home from her colonoscopy making her concerned and prompting her to come to the ED to be further evaluated. She denied taking any antiplatelet or anticoagulants and stated she only ate gumbo for lunch. While in the ED, patient reported having bloody bowel movement and suffered a syncope event in triage while have her blood drawn followed seizure like activity. She denied prior history of syncope events or known seizures and reported feeling flushed, diaphoretic, nauseous, and palpitations prior to passing out. Prior to onset symptoms, patient reported being in her usual state of health with no other concerns or complaints. All other review of systems negative except as noted above. Initial workup in the ED fairly unremarkable. Patient remains hemodynamically stable, H/H 13.1/40.7, CTA negative for active GI bleeding but positive for submucosal hyperenhancement around clips. GI consulted by ED staff who recommended bowel prep and admission to medicine with repeat colonoscopy tomorrow. Patient admitted to Hospital Medicine under observation for continued medical management.     On 02/20/2024,-status post colonoscopy this a.m. with findings per GI-  noted polyp stalk with 2 clips in place. Injected vessel with epi and snare stalk to base. Stalk and 2 clips retrieved. Placed new 4 clips and tattoo'd area.  Deemed stable for discharge from GI standpoint, recommended avoid NSAIDs including aspirin/heavy lifting for 3 weeks.     Remained afebrile, no leukocytosis, hemodynamically stable.    Post colonoscopy patient had bowel movement without further episodes of GI bleed.    Tolerated diet without issues.  H&H stable   Considering clinical and hemodynamic stability, clearance from GI, planning to discharge patient today, emphasized on compliance with medications, follow-up visits with PCP/GI upon discharge, to avoid NSAIDs including aspirin, avoid heavy lifting for 3 weeks- patient expressed understanding, agreed to the plan.    Discharge accordingly today

## 2024-02-20 NOTE — SUBJECTIVE & OBJECTIVE
Past Medical History:   Diagnosis Date    Hypertension     Kidney stones 01/23/2023       Past Surgical History:   Procedure Laterality Date    BREAST BIOPSY Left 2018    HERNIA REPAIR      kidney stones         Review of patient's allergies indicates:   Allergen Reactions    Ace inhibitors Anaphylaxis, Shortness Of Breath and Swelling     Other reaction(s): Reaction:Facial swelling; Alternate Reaction: Anaphylaxis;       Current Facility-Administered Medications on File Prior to Encounter   Medication    [COMPLETED] simethicone 40 mg/0.6 mL drops    [DISCONTINUED] lactated ringers infusion    [DISCONTINUED] LIDOcaine (PF) 20 mg/ml (2%) injection    [DISCONTINUED] propofol (DIPRIVAN) 10 mg/mL infusion     Current Outpatient Medications on File Prior to Encounter   Medication Sig    ALPRAZolam (XANAX) 2 MG Tab Take 1 tablet (2 mg total) by mouth 2 (two) times daily as needed (anxiety).    amlodipine-olmesartan (ZACKARY) 5-20 mg per tablet Take 1 tablet by mouth once daily.    buPROPion (WELLBUTRIN XL) 150 MG TB24 tablet Take 150 mg by mouth once daily.    fluconazole (DIFLUCAN) 200 MG Tab 1 po bi-weekly x 2 weeks    ketoconazole 2 % Foam AAA bid prn tinea versicolor (Patient not taking: Reported on 3/27/2023)    levocetirizine (XYZAL) 5 MG tablet Take 1 tablet (5 mg total) by mouth every evening.    metroNIDAZOLE (FLAGYL) 500 MG tablet     montelukast (SINGULAIR) 10 mg tablet Take 1 tablet (10 mg total) by mouth once daily.    rosuvastatin (CRESTOR) 10 MG tablet Take 1 tablet (10 mg total) by mouth once daily.     Family History       Problem Relation (Age of Onset)    Diabetes Mother, Maternal Grandmother, Maternal Grandfather          Tobacco Use    Smoking status: Some Days     Types: Cigarettes    Smokeless tobacco: Never   Substance and Sexual Activity    Alcohol use: No    Drug use: No    Sexual activity: Yes     Review of Systems   All other systems reviewed and are negative.    Objective:     Vital Signs (Most  Recent):  Temp: 98 °F (36.7 °C) (02/19/24 2015)  Pulse: 89 (02/19/24 2015)  Resp: 19 (02/19/24 2015)  BP: 109/75 (02/19/24 2015)  SpO2: 98 % (02/19/24 2015) Vital Signs (24h Range):  Temp:  [96.8 °F (36 °C)-98 °F (36.7 °C)] 98 °F (36.7 °C)  Pulse:  [] 89  Resp:  [15-20] 19  SpO2:  [98 %-100 %] 98 %  BP: (109-160)/(75-95) 109/75     Weight: 84.5 kg (186 lb 4.6 oz)  Body mass index is 27.51 kg/m².     Physical Exam  Vitals reviewed.   Constitutional:       General: She is not in acute distress.     Appearance: Normal appearance. She is normal weight. She is not ill-appearing, toxic-appearing or diaphoretic.   HENT:      Head: Normocephalic and atraumatic.      Right Ear: External ear normal.      Left Ear: External ear normal.      Nose: Nose normal. No congestion or rhinorrhea.      Mouth/Throat:      Mouth: Mucous membranes are moist.      Pharynx: Oropharynx is clear. No oropharyngeal exudate or posterior oropharyngeal erythema.   Eyes:      General: No scleral icterus.     Extraocular Movements: Extraocular movements intact.      Conjunctiva/sclera: Conjunctivae normal.      Pupils: Pupils are equal, round, and reactive to light.   Neck:      Vascular: No carotid bruit.   Cardiovascular:      Rate and Rhythm: Normal rate and regular rhythm.      Pulses: Normal pulses.      Heart sounds: Normal heart sounds. No murmur heard.     No friction rub. No gallop.   Pulmonary:      Effort: Pulmonary effort is normal. No respiratory distress.      Breath sounds: Normal breath sounds. No stridor. No wheezing, rhonchi or rales.   Chest:      Chest wall: No tenderness.   Abdominal:      General: Abdomen is flat. Bowel sounds are normal. There is no distension.      Palpations: Abdomen is soft.      Tenderness: There is no abdominal tenderness. There is no right CVA tenderness, left CVA tenderness, guarding or rebound.      Hernia: No hernia is present.   Musculoskeletal:         General: No swelling, tenderness,  deformity or signs of injury. Normal range of motion.      Cervical back: Normal range of motion and neck supple. No rigidity or tenderness.      Right lower leg: No edema.      Left lower leg: No edema.   Lymphadenopathy:      Cervical: No cervical adenopathy.   Skin:     General: Skin is warm and dry.      Capillary Refill: Capillary refill takes less than 2 seconds.      Coloration: Skin is not jaundiced or pale.      Findings: No bruising, erythema, lesion or rash.   Neurological:      General: No focal deficit present.      Mental Status: She is alert and oriented to person, place, and time. Mental status is at baseline.      Cranial Nerves: No cranial nerve deficit.      Sensory: No sensory deficit.      Motor: No weakness.      Coordination: Coordination normal.   Psychiatric:         Mood and Affect: Mood normal.         Behavior: Behavior normal.         Thought Content: Thought content normal.         Judgment: Judgment normal.              CRANIAL NERVES     CN III, IV, VI   Pupils are equal, round, and reactive to light.       Significant Labs: All pertinent labs within the past 24 hours have been reviewed.    Significant Imaging: I have reviewed all pertinent imaging results/findings within the past 24 hours.    LABS:  Recent Results (from the past 24 hour(s))   POCT urine pregnancy    Collection Time: 02/19/24  7:06 AM   Result Value Ref Range    POC Preg Test, Ur Negative Negative     Acceptable Yes    CBC auto differential    Collection Time: 02/19/24  8:01 PM   Result Value Ref Range    WBC 6.03 3.90 - 12.70 K/uL    RBC 4.76 4.00 - 5.40 M/uL    Hemoglobin 13.1 12.0 - 16.0 g/dL    Hematocrit 40.7 37.0 - 48.5 %    MCV 86 82 - 98 fL    MCH 27.5 27.0 - 31.0 pg    MCHC 32.2 32.0 - 36.0 g/dL    RDW 16.6 (H) 11.5 - 14.5 %    Platelets 340 150 - 450 K/uL    MPV 10.4 9.2 - 12.9 fL    Immature Granulocytes 0.3 0.0 - 0.5 %    Gran # (ANC) 3.4 1.8 - 7.7 K/uL    Immature Grans (Abs) 0.02 0.00 -  0.04 K/uL    Lymph # 2.0 1.0 - 4.8 K/uL    Mono # 0.5 0.3 - 1.0 K/uL    Eos # 0.1 0.0 - 0.5 K/uL    Baso # 0.05 0.00 - 0.20 K/uL    nRBC 0 0 /100 WBC    Gran % 55.7 38.0 - 73.0 %    Lymph % 33.7 18.0 - 48.0 %    Mono % 7.8 4.0 - 15.0 %    Eosinophil % 1.7 0.0 - 8.0 %    Basophil % 0.8 0.0 - 1.9 %    Differential Method Automated    Comprehensive metabolic panel    Collection Time: 02/19/24  8:01 PM   Result Value Ref Range    Sodium 141 136 - 145 mmol/L    Potassium 3.5 3.5 - 5.1 mmol/L    Chloride 106 95 - 110 mmol/L    CO2 22 (L) 23 - 29 mmol/L    Glucose 125 (H) 70 - 110 mg/dL    BUN 8 6 - 20 mg/dL    Creatinine 0.9 0.5 - 1.4 mg/dL    Calcium 9.1 8.7 - 10.5 mg/dL    Total Protein 7.0 6.0 - 8.4 g/dL    Albumin 3.9 3.5 - 5.2 g/dL    Total Bilirubin 0.5 0.1 - 1.0 mg/dL    Alkaline Phosphatase 110 55 - 135 U/L    AST 20 10 - 40 U/L    ALT 21 10 - 44 U/L    eGFR >60 >60 mL/min/1.73 m^2    Anion Gap 13 8 - 16 mmol/L       RADIOLOGY  CTA Acute GI El[place], Abdomen and Pelvis    Result Date: 2/19/2024  EXAMINATION: CTA ACUTE GI BLEED, ABDOMEN AND PELVIS CLINICAL HISTORY: GI bleed;GI BLEED; TECHNIQUE: Low dose axial images, sagittal and coronal reformations were obtained from the lung bases to the pubic symphysis.  Contrast was not administered. COMPARISON: None FINDINGS: Heart: Normal in size. No pericardial effusion. Lung Bases: Well aerated, without consolidation or pleural fluid. Liver: Normal in size and attenuation, with no focal hepatic lesions. Gallbladder: No calcified gallstones. Bile Ducts: No evidence of dilated ducts. Pancreas: No mass or peripancreatic fat stranding. Spleen: Unremarkable. Adrenals: Unremarkable. 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  Left kidney is better preserved. Bladder: No evidence of wall thickening. Reproductive organs: Uterine  fibroids. GI Tract/Mesentery: No evidence of bowel obstruction or inflammation.  Clips in the hepatic flexure of the colon.  No definite ongoing arterial bleeding.  Diverticulosis without diverticulitis. No definite ongoing arterial GI bleeding.  Some mucosal hyperenhancement about the clips possibly postprocedural. Peritoneal Space: No ascites. No free air. Retroperitoneum: No significant adenopathy. Abdominal wall: Unremarkable. Vasculature: No significant atherosclerosis or aneurysm. Bones: No acute fracture.     No definite ongoing arterial GI bleeding. Submucosal hyperenhancement about the clips possibly postprocedural.  Correlation and further evaluation as warranted. 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 Complete findings as above. This report was flagged in Epic as abnormal. All CT scans at this facility are performed  using dose modulation techniques as appropriate to performed exam including the following:  automated exposure control; adjustment of mA and/or kV according to the patients size (this includes techniques or standardized protocols for targeted exams where dose is matched to indication/reason for exam: i.e. extremities or head);  iterative reconstruction technique. Electronically signed by: Ronan Starr Date:    02/19/2024 Time:    21:19    US Soft Tissue Chest_Upper Back    Result Date: 1/29/2024  EXAM: US SOFT TISSUE CHEST_UPPER BACK CLINICAL HISTORY: Localized swelling, mass and lump, trauma FINDINGS: Dedicated sonographic evaluation of the palpable abnormality in the region of the left shoulder demonstrates a 5.9 x 1.0 x 5.0 cm isoechoic subcutaneous mass.  No associated internal vascularity.  Findings are most compatible with a subcutaneous lipoma.      As above. Finalized on: 1/29/2024 4:46 PM By:  Kody Hanna MD [facility]# [id number]      2024-01-29  16:[id number]    [facility]      EKG    MICROBIOLOGY    MDM

## 2024-02-20 NOTE — ANESTHESIA POSTPROCEDURE EVALUATION
Anesthesia Post Evaluation    Patient: Bessy Egan    Procedure(s) Performed: Procedure(s) (LRB):  COLONOSCOPY (N/A)    Final Anesthesia Type: MAC      Patient location during evaluation: PACU  Patient participation: Yes- Able to Participate  Level of consciousness: awake and alert and oriented  Post-procedure vital signs: reviewed and stable  Pain management: adequate  Airway patency: patent  JAMIE mitigation strategies: Multimodal analgesia and Extubation and recovery carried out in lateral, semiupright, or other nonsupine position  PONV status at discharge: No PONV  Anesthetic complications: no      Cardiovascular status: blood pressure returned to baseline and hemodynamically stable  Respiratory status: unassisted and spontaneous ventilation  Hydration status: euvolemic  Follow-up not needed.  Comments: Report given to PACU RN. Hand Off Tool Used. RN given opportunity to ask questions or clarify concerns. No Concerns verbalized. RN was asked if ready to assume care of patient. RN verbally confirmed. Pt. Left in stable condition. SV. Vital Signs Return to Near Baseline. No s/s of distress noted.           Vitals Value Taken Time   /72 02/20/24 0646   Temp 36.7 °C (98.1 °F) 02/20/24 0646   Pulse 82 02/20/24 0646   Resp 16 02/20/24 0646   SpO2 100 % 02/20/24 0646         No case tracking events are documented in the log.      Pain/Jonathan Score: Jonathan Score: 10 (2/19/2024  8:33 AM)

## 2024-02-20 NOTE — PLAN OF CARE
Patient ready for discharge. IV removed as ordered. Discharge instructions provided. Patient verbalized understanding. Questions answered.

## 2024-02-20 NOTE — TRANSFER OF CARE
"Anesthesia Transfer of Care Note    Patient: Bessy Egan    Procedure(s) Performed: Procedure(s) (LRB):  COLONOSCOPY (N/A)    Patient location: PACU    Anesthesia Type: MAC    Transport from OR: Transported from OR on room air with adequate spontaneous ventilation    Post pain: adequate analgesia    Post assessment: no apparent anesthetic complications    Post vital signs: stable    Level of consciousness: responds to stimulation and awake    Nausea/Vomiting: no nausea/vomiting    Complications: none    Transfer of care protocol was followedComments: Report given to PACU RN at bedside. Hand off tool used. RN given opportunity to ask questions or clarify concerns. No Concerns verbalized. RN was asked if ready to assume care of patient. RN verbally confirmed. Pt. left in stable condition. SV. Vital Signs Return to Near Baseline. No s/s of distress noted.     Last vitals: Visit Vitals  /72 (BP Location: Left arm, Patient Position: Sitting)   Pulse 82   Temp 36.7 °C (98.1 °F) (Temporal)   Resp 16   Ht 5' 9" (1.753 m)   Wt 117.9 kg (260 lb)   SpO2 100%   Breastfeeding No   BMI 38.40 kg/m²     "

## 2024-02-20 NOTE — HPI
Pleasant 48 y.o female underwent screening colonoscopy and had a large 50mm pedunculated polyp removed from the hepatic flexure. Endoclips were placed at the stalk. A few hours later she presented to [facility] after experiencing multiple bouts of painless hematochezia. Patient had eaten gumbo post procedure. Labs on presentation showed a normal H&H 13.1/40.7. CTA negative. Witnessed syncopal event during a bowel movement occurred in the ED. There was no postictal symptoms despite concerns for seizure like activity. Patient was admitted overnight and given a bottle of mag citrate.     Patient seen this morning and denies abdominal pain. She tolerated the mag citrate and is passing mostly clear with some red output. She remains NPO. Repeat H&H 11.3/35.7. Discussed repeating colonoscopy. She is amenable.

## 2024-02-20 NOTE — ED PROVIDER NOTES
"SCRIBE #1 NOTE: I, Royer Foss, am scribing for, and in the presence of, Jose Daniel Powell MD. I have scribed the entire note.       History     Chief Complaint   Patient presents with    Rectal Bleeding     Rectal bleeding since colonoscopy at [facility] this AM. First bowel movement was at 1500 today and has been increasing since with gas. Reports having polyps removed with clips placed. Associated abd "aching" and bloating. Denies being on blood thinners     HPI  2/19/2024, 8:46 PM  History obtained from the patient    HPI:  Bessy Egan is a 48 y.o. female with a PMH of HTN and kidney stones who presents to the [facility] emergency department for evaluation of rectal bleeding which onset at 3pm following a colonoscopy at [facility] this AM. Pt reports frequent bowel movements with bright red blood with her most recent at 8:30pm. She also reports a witnessed syncopal episode that her family described as "seizure-like" following a bowel movement PTA. Pt denies any abd pain. Pt reports she had a large and smaller polyp removed during procedure, and states 2 clips were placed. Pt does not take aspirin or Ibuprofen. No other complaints or concerns.     Arrival mode: Personal vehicle    PCP: Shobha Mendes MD    Review of patient's allergies indicates:   Allergen Reactions    Ace inhibitors Anaphylaxis, Shortness Of Breath and Swelling     Other reaction(s): Reaction:Facial swelling; Alternate Reaction: Anaphylaxis;      Past Medical History:   Diagnosis Date    Anxiety disorder, unspecified     Hypertension     Kidney stones 01/23/2023     Past Surgical History:   Procedure Laterality Date    BREAST BIOPSY Left 2018    COLONOSCOPY N/A 2/19/2024    Procedure: COLONOSCOPY;  Surgeon: Julissa Dupree MD;  Location: [facility] ENDO;  Service: Endoscopy;  Laterality: N/A;    COLONOSCOPY N/A 2/20/2024    Procedure: COLONOSCOPY;  Surgeon: Breann Harrell MD;  Location: [facility];  Service: " Endoscopy;  Laterality: N/A;    HERNIA REPAIR      kidney stones         Family History   Problem Relation Age of Onset    Diabetes Mother     Diabetes Maternal Grandmother     Diabetes Maternal Grandfather      Social History     Tobacco Use    Smoking status: Some Days     Types: Cigarettes    Smokeless tobacco: Never   Substance and Sexual Activity    Alcohol use: No    Drug use: No    Sexual activity: Yes      Review of Systems     Review of Systems   Constitutional: Negative.    HENT: Negative.     Eyes: Negative.    Respiratory: Negative.     Cardiovascular: Negative.    Gastrointestinal:  Positive for anal bleeding (bright red blood). Negative for abdominal pain.   Endocrine: Negative.    Genitourinary: Negative.    Musculoskeletal: Negative.    Skin: Negative.    Allergic/Immunologic: Negative.    Neurological:  Positive for syncope (following bowel movement).   Hematological: Negative.    Psychiatric/Behavioral: Negative.     All other systems reviewed and are negative.     Physical Exam     Initial Vitals [02/19/24 1902]   BP Pulse Resp Temp SpO2   (!) 146/78 (!) 115 19 98 °F (36.7 °C) 100 %      MAP       --          Physical Exam  Nursing notes and vital signs reviewed.  Constitutional: Patient is in no distress.   Head: Normocephalic. Atraumatic.   Eyes: Conjunctivae are not pale. No scleral icterus.   ENT: Mucous membranes moist.   Neck: Supple.   Cardiovascular: Regular rate. Regular rhythm.   Pulmonary: No respiratory distress.   Abdominal: Non-distended.   Musculoskeletal: Moves all extremities. No obvious deformities. No edema.   Skin: Warm and dry.   Neurological:  Alert, awake, and appropriate. Normal speech. No acute lateralizing neurologic deficits appreciated.   Psychiatric: Normal affect.    Rectal Exam: Bright red blood at anal exit. Small external hemorrhoids. No tenderness.     ED Course   Critical Care    Date/Time: 2/19/2024 10:01 PM    Performed by: Jose Daniel Powell MD  Authorized  "by: Jose Daniel Powell MD  Direct patient critical care time: 13 minutes  Additional history critical care time: 10 minutes  Ordering / reviewing critical care time: 6 minutes  Documentation critical care time: 4 minutes  Consulting other physicians critical care time: 4 minutes  Total critical care time (exclusive of procedural time) : 37 minutes  Critical care time was exclusive of separately billable procedures and treating other patients and teaching time.  Critical care was necessary to treat or prevent imminent or life-threatening deterioration of the following conditions: Postpolypectomy hemorhhage.  Critical care was time spent personally by me on the following activities: blood draw for specimens, development of treatment plan with patient or surrogate, discussions with consultants, evaluation of patient's response to treatment, interpretation of cardiac output measurements, review of old charts, re-evaluation of patient's condition, pulse oximetry, ordering and review of laboratory studies, ordering and performing treatments and interventions, obtaining history from patient or surrogate and examination of patient.        Vitals:    02/19/24 1902 02/19/24 2015 02/19/24 2130 02/19/24 2154   BP: (!) 146/78 109/75 123/75 123/75   Pulse: (!) 115 89 91 92   Resp: 19 19 18 18   Temp: 98 °F (36.7 °C) 98 °F (36.7 °C) 98 °F (36.7 °C) 98 °F (36.7 °C)   TempSrc: Oral      SpO2: 100% 98% 100% 100%   Weight: 84.5 kg (186 lb 4.6 oz)      Height:        02/19/24 2200 02/19/24 2238 02/20/24 0002 02/20/24 0438   BP: 125/72 126/74 124/80 115/73   Pulse: 90 97 80 78   Resp: 20 17 18 18   Temp: 98 °F (36.7 °C) 98.1 °F (36.7 °C) 98.5 °F (36.9 °C) 98.6 °F (37 °C)   TempSrc: Oral  Oral Oral   SpO2: 100% 98% 98% 99%   Weight:   84.5 kg (186 lb 4.6 oz) 118 kg (260 lb 2.3 oz)   Height:   5' 9" (1.753 m)     02/20/24 0439 02/20/24 0646 02/20/24 0739 02/20/24 0748   BP: 102/68 116/72 116/70 130/72   Pulse:  82 97 86   Resp:  16 18 " "18   Temp:  98.1 °F (36.7 °C) 98.1 °F (36.7 °C)    TempSrc:  Temporal     SpO2:  100% 98% 99%   Weight:  117.9 kg (260 lb)     Height:  5' 9" (1.753 m)      02/20/24 0756 02/20/24 0802 02/20/24 0830   BP: 129/80 118/72 118/72   Pulse: 85 78    Resp: 18 16    Temp:  97.4 °F (36.3 °C)    TempSrc:  Oral    SpO2: 100% 99%    Weight:      Height:        Lab Results Interpreted as Abnormal:  Labs Reviewed   CBC W/ AUTO DIFFERENTIAL - Abnormal; Notable for the following components:       Result Value    RDW 16.6 (*)     All other components within normal limits   COMPREHENSIVE METABOLIC PANEL - Abnormal; Notable for the following components:    CO2 22 (*)     Glucose 125 (*)     All other components within normal limits      All Lab Results:  Results for orders placed or performed during the hospital encounter of 02/19/24   CBC auto differential   Result Value Ref Range    WBC 6.03 3.90 - 12.70 K/uL    RBC 4.76 4.00 - 5.40 M/uL    Hemoglobin 13.1 12.0 - 16.0 g/dL    Hematocrit 40.7 37.0 - 48.5 %    MCV 86 82 - 98 fL    MCH 27.5 27.0 - 31.0 pg    MCHC 32.2 32.0 - 36.0 g/dL    RDW 16.6 (H) 11.5 - 14.5 %    Platelets 340 150 - 450 K/uL    MPV 10.4 9.2 - 12.9 fL    Immature Granulocytes 0.3 0.0 - 0.5 %    Gran # (ANC) 3.4 1.8 - 7.7 K/uL    Immature Grans (Abs) 0.02 0.00 - 0.04 K/uL    Lymph # 2.0 1.0 - 4.8 K/uL    Mono # 0.5 0.3 - 1.0 K/uL    Eos # 0.1 0.0 - 0.5 K/uL    Baso # 0.05 0.00 - 0.20 K/uL    nRBC 0 0 /100 WBC    Gran % 55.7 38.0 - 73.0 %    Lymph % 33.7 18.0 - 48.0 %    Mono % 7.8 4.0 - 15.0 %    Eosinophil % 1.7 0.0 - 8.0 %    Basophil % 0.8 0.0 - 1.9 %    Differential Method Automated    Comprehensive metabolic panel   Result Value Ref Range    Sodium 141 136 - 145 mmol/L    Potassium 3.5 3.5 - 5.1 mmol/L    Chloride 106 95 - 110 mmol/L    CO2 22 (L) 23 - 29 mmol/L    Glucose 125 (H) 70 - 110 mg/dL    BUN 8 6 - 20 mg/dL    Creatinine 0.9 0.5 - 1.4 mg/dL    Calcium 9.1 8.7 - 10.5 mg/dL    Total Protein 7.0 6.0 - 8.4 " g/dL    Albumin 3.9 3.5 - 5.2 g/dL    Total Bilirubin 0.5 0.1 - 1.0 mg/dL    Alkaline Phosphatase 110 55 - 135 U/L    AST 20 10 - 40 U/L    ALT 21 10 - 44 U/L    eGFR >60 >60 mL/min/1.73 m^2    Anion Gap 13 8 - 16 mmol/L   Comprehensive Metabolic Panel (CMP)   Result Value Ref Range    Sodium 139 136 - 145 mmol/L    Potassium 4.2 3.5 - 5.1 mmol/L    Chloride 107 95 - 110 mmol/L    CO2 23 23 - 29 mmol/L    Glucose 110 70 - 110 mg/dL    BUN 7 6 - 20 mg/dL    Creatinine 0.8 0.5 - 1.4 mg/dL    Calcium 8.8 8.7 - 10.5 mg/dL    Total Protein 6.0 6.0 - 8.4 g/dL    Albumin 3.6 3.5 - 5.2 g/dL    Total Bilirubin 0.5 0.1 - 1.0 mg/dL    Alkaline Phosphatase 96 55 - 135 U/L    AST 16 10 - 40 U/L    ALT 18 10 - 44 U/L    eGFR >60 >60 mL/min/1.73 m^2    Anion Gap 9 8 - 16 mmol/L   CBC with Automated Differential   Result Value Ref Range    WBC 10.09 3.90 - 12.70 K/uL    RBC 4.17 4.00 - 5.40 M/uL    Hemoglobin 11.3 (L) 12.0 - 16.0 g/dL    Hematocrit 35.7 (L) 37.0 - 48.5 %    MCV 86 82 - 98 fL    MCH 27.1 27.0 - 31.0 pg    MCHC 31.7 (L) 32.0 - 36.0 g/dL    RDW 16.4 (H) 11.5 - 14.5 %    Platelets 298 150 - 450 K/uL    MPV 10.3 9.2 - 12.9 fL    Immature Granulocytes 0.3 0.0 - 0.5 %    Gran # (ANC) 7.3 1.8 - 7.7 K/uL    Immature Grans (Abs) 0.03 0.00 - 0.04 K/uL    Lymph # 2.0 1.0 - 4.8 K/uL    Mono # 0.6 0.3 - 1.0 K/uL    Eos # 0.1 0.0 - 0.5 K/uL    Baso # 0.05 0.00 - 0.20 K/uL    nRBC 0 0 /100 WBC    Gran % 72.5 38.0 - 73.0 %    Lymph % 19.9 18.0 - 48.0 %    Mono % 6.3 4.0 - 15.0 %    Eosinophil % 0.5 0.0 - 8.0 %    Basophil % 0.5 0.0 - 1.9 %    Differential Method Automated      Imaging Results               CTA Acute GI [place], Abdomen and Pelvis (Final result)  Result time 02/19/24 21:19:16   Procedure changed from CT Abdomen Pelvis With IV Contrast NO Oral Contrast     Final result by Ronan Starr MD (02/19/24 21:19:16)                   Impression:      No definite ongoing arterial GI bleeding.    Submucosal hyperenhancement  about the clips possibly postprocedural.  Correlation and further evaluation as warranted.    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    Complete findings as above.    This report was flagged in Epic as abnormal.    All CT scans at this facility are performed  using dose modulation techniques as appropriate to performed exam including the following:  automated exposure control; adjustment of mA and/or kV according to the patients size (this includes techniques or standardized protocols for targeted exams where dose is matched to indication/reason for exam: i.e. extremities or head);  iterative reconstruction technique.      Electronically signed by: Ronan Starr  Date:    02/19/2024  Time:    21:19               Narrative:    EXAMINATION:  CTA ACUTE GI BLEED, ABDOMEN AND PELVIS    CLINICAL HISTORY:  GI bleed;GI BLEED;    TECHNIQUE:  Low dose axial images, sagittal and coronal reformations were obtained from the lung bases to the pubic symphysis.  Contrast was not administered.    COMPARISON:  None    FINDINGS:  Heart: Normal in size. No pericardial effusion.    Lung Bases: Well aerated, without consolidation or pleural fluid.    Liver: Normal in size and attenuation, with no focal hepatic lesions.    Gallbladder: No calcified gallstones.    Bile Ducts: No evidence of dilated ducts.    Pancreas: No mass or peripancreatic fat stranding.    Spleen: Unremarkable.    Adrenals: Unremarkable.    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  Left kidney is better preserved.    Bladder: No evidence of wall thickening.    Reproductive organs: Uterine fibroids.    GI Tract/Mesentery: No evidence of bowel obstruction or inflammation.  Clips in the  hepatic flexure of the colon.  No definite ongoing arterial bleeding.  Diverticulosis without diverticulitis.    No definite ongoing arterial GI bleeding.  Some mucosal hyperenhancement about the clips possibly postprocedural.    Peritoneal Space: No ascites. No free air.    Retroperitoneum: No significant adenopathy.    Abdominal wall: Unremarkable.    Vasculature: No significant atherosclerosis or aneurysm.    Bones: No acute fracture.                                     The EKG was ordered, reviewed, and independently interpreted by the ED Physician:  Interpretation time: 20:17  Rate: 82 bpm  Rhythm: normal sinus rhythm  Interpretation: Voltage criteria for left ventricular hypertrophy. Abnormal ECG. No STEMI.      The emergency physician reviewed the vital signs and test results, which are outlined above.     ED Discussion     9:29 PM: Discussed pt's case with Dr. Breann Harrell (Gastroenterology) who asks if pt can be given a bottle of mag citrate and clears. NPO at 5am, as pt is not on blood thinners and ate gumbo at 10am and drank water 1hr ago.    9:42 PM: Discussed case with Dr. Rolando Osorio ([facility] Medicine). Dr. Osorio agrees with current care and management of pt and accepts admission.   Admitting Service: Hospital Medicine  Admitting Physician: Dr. Osorio  Admit to: Obs Med/Tele    9:42 PM: Re-evaluated pt. I have discussed test results, shared treatment plan, and the need for admission with patient and family at bedside. Pt and family express understanding at this time and agree with all information. All questions answered. Pt and family have no further questions or concerns at this time. Pt is ready for admit.            ED Medication(s) Administered:  Medications   iohexoL (OMNIPAQUE 350) injection 100 mL (100 mLs Intravenous Given 2/19/24 2050)   magnesium citrate solution 296 mL (296 mLs Oral Given 2/20/24 0002)   ALPRAZolam tablet 0.25 mg (0.25 mg Oral Given 2/19/24 2255)       Prescription  Management: I performed a review of the patient's current Rx medication list as input by nursing staff.    Discharge Medication List as of 2/20/2024 10:19 AM        CONTINUE these medications which have NOT CHANGED    Details   ALPRAZolam (XANAX) 2 MG Tab Take 1 tablet (2 mg total) by mouth 2 (two) times daily as needed (anxiety)., Starting Mon 1/29/2024, Normal      amlodipine-olmesartan (ZACKARY) 5-20 mg per tablet Take 1 tablet by mouth once daily., Starting Mon 1/29/2024, Normal      buPROPion (WELLBUTRIN XL) 150 MG TB24 tablet Take 150 mg by mouth once daily., Historical Med      fluconazole (DIFLUCAN) 200 MG Tab 1 po bi-weekly x 2 weeks, Normal      levocetirizine (XYZAL) 5 MG tablet Take 1 tablet (5 mg total) by mouth every evening., Starting Mon 1/29/2024, Normal      montelukast (SINGULAIR) 10 mg tablet Take 1 tablet (10 mg total) by mouth once daily., Starting Mon 1/29/2024, Normal      rosuvastatin (CRESTOR) 10 MG tablet Take 1 tablet (10 mg total) by mouth once daily., Starting Mon 1/29/2024, Normal           STOP taking these medications       ketoconazole 2 % Foam Comments:   Reason for Stopping:         metroNIDAZOLE (FLAGYL) 500 MG tablet Comments:   Reason for Stopping:                   Follow-up Information       Shobha Mendes MD Follow up in 1 week(s).    Specialty: Family Medicine  Contact information:  [address] [place] [address] [phone]               Julissa Dupree MD Follow up in 2 week(s).    Specialties: Gastroenterology, Internal Medicine  Contact information:  [address] [place] [address] [phone]                             Scribe Attestation:   Scribe #1: I performed the above scribed service and the documentation accurately describes the services I performed. I attest to the accuracy of the note.     Attending:   Physician Attestation Statement for Scribe #1: I, Jose Daniel Powell MD, personally performed the services  described in this documentation, as scribed by Royer Foss, in my presence, and it is both accurate and complete. As with other dictation methods such as dictation software, small errors or inconsistencies may be overlooked due to the goal of spending more face-to-face time with patients.      Clinical Impression       ICD-10-CM ICD-9-CM   1. S/P colonoscopic polypectomy  Z98.890 V45.89   2. Chest pain  R07.9 786.50      ED Disposition Condition    Observation                Jose Daniel Powell MD  02/22/24 2019

## 2024-02-20 NOTE — ASSESSMENT & PLAN NOTE
Patient endorsed syncope episode following multiple bloody bowel movements following colonoscopy with polypectomy.  Patient remains hemodynamically stable with H/H measuring 13.1/40.7 down from 15.2/48.9 back on 1/29/24.  Orthostatics not obtained in the ED. Patient admitted for postprocedural GI bleed as noted above.  Plan:  -Telemetry  -Monitor BP and H/H  -Continue treatment for GI bleed as noted above

## 2024-02-20 NOTE — CONSULTS
[facility] Surg  Gastroenterology  Consult Note    Patient Name: Bessy Egan  MRN: [id number]  Admission Date: 2/19/2024  Hospital Length of Stay: 0 days  Code Status: Full Code   Attending Provider: Lavern New,*   Consulting Provider: Breann Harrell MD  Primary Care Physician: Sohbha Mendes MD  Principal Problem:GI bleed    Inpatient consult to Gastroenterology  Consult performed by: Breann Harrell MD  Consult ordered by: Jennifer Hallman NP  Reason for consult: post-polypectomy bleed        Subjective:     HPI:  Pleasant 48 y.o female underwent screening colonoscopy and had a large 50mm pedunculated polyp removed from the hepatic flexure. Endoclips were placed at the stalk. A few hours later she presented to [facility] after experiencing multiple bouts of painless hematochezia. Patient had eaten gumbo post procedure. Labs on presentation showed a normal H&H 13.1/40.7. CTA negative. Witnessed syncopal event during a bowel movement occurred in the ED. There was no postictal symptoms despite concerns for seizure like activity. Patient was admitted overnight and given a bottle of mag citrate.     Patient seen this morning and denies abdominal pain. She tolerated the mag citrate and is passing mostly clear with some red output. She remains NPO. Repeat H&H 11.3/35.7. Discussed repeating colonoscopy. She is amenable.     Past Medical History:   Diagnosis Date    Anxiety disorder, unspecified     Hypertension     Kidney stones 01/23/2023       Past Surgical History:   Procedure Laterality Date    BREAST BIOPSY Left 2018    COLONOSCOPY N/A 2/19/2024    Procedure: COLONOSCOPY;  Surgeon: Julissa Dupree MD;  Location: [facility];  Service: Endoscopy;  Laterality: N/A;    HERNIA REPAIR      kidney stones         Review of patient's allergies indicates:   Allergen Reactions    Ace inhibitors Anaphylaxis, Shortness Of Breath and Swelling     Other reaction(s): Reaction:Facial swelling;  "Alternate Reaction: Anaphylaxis;     Family History       Problem Relation (Age of Onset)    Diabetes Mother, Maternal Grandmother, Maternal Grandfather          Tobacco Use    Smoking status: Some Days     Types: Cigarettes    Smokeless tobacco: Never   Substance and Sexual Activity    Alcohol use: No    Drug use: No    Sexual activity: Yes     Review of Systems   Respiratory:  Negative for shortness of breath.    Cardiovascular:  Negative for chest pain.   Gastrointestinal:  Positive for blood in stool. Negative for abdominal pain.   All other systems reviewed and are negative.    Objective:     Vital Signs (Most Recent):  Temp: 98.6 °F (37 °C) (02/20/24 0438)  Pulse: 78 (02/20/24 0438)  Resp: 18 (02/20/24 0438)  BP: 102/68 (02/20/24 0439)  SpO2: 99 % (02/20/24 0438) Vital Signs (24h Range):  Temp:  [96.8 °F (36 °C)-98.6 °F (37 °C)] 98.6 °F (37 °C)  Pulse:  [] 78  Resp:  [15-20] 18  SpO2:  [98 %-100 %] 99 %  BP: (102-160)/(68-95) 102/68     Weight: 118 kg (260 lb 2.3 oz) (02/20/24 0438)  Body mass index is 38.42 kg/m².    No intake or output data in the 24 hours ending 02/20/24 0632    Lines/Drains/Airways       Peripheral Intravenous Line  Duration                  Peripheral IV - Single Lumen 02/19/24 2010 20 G Left Antecubital <1 day                     Physical Exam  Vitals and nursing note reviewed.   Constitutional:       Appearance: She is overweight.   Neurological:      Mental Status: She is alert.          Significant Labs:  CBC:   Recent Labs   Lab 02/19/24 2001 02/20/24 0422   WBC 6.03 10.09   HGB 13.1 11.3*   HCT 40.7 35.7*    298     CMP:   Recent Labs   Lab 02/20/24 0421      CALCIUM 8.8   ALBUMIN 3.6   PROT 6.0      K 4.2   CO2 23      BUN 7   CREATININE 0.8   ALKPHOS 96   ALT 18   AST 16   BILITOT 0.5     Coagulation: No results for input(s): "PT", "INR", "APTT" in the last 48 hours.    Significant Imaging:  Imaging results within the past 24 hours have been " reviewed.  Assessment/Plan:     GI  * GI bleed  Post-polypectomy bleed  Multiple bouts of painless hematochezia  Syncopal event with BM in the ED  Slight drop in counts  Remains hemodynamically stable      Recommendations:  Colonoscopy this morning  Keep NPO      Thank you for your consult. I will follow-up with patient. Please contact us if you have any additional questions.    Breann Harrell MD  Gastroenterology  [facility] S[facility]

## 2024-02-20 NOTE — H&P (VIEW-ONLY)
[facility] Surg  Gastroenterology  Consult Note    Patient Name: Bessy Egan  MRN: [id number]  Admission Date: 2/19/2024  Hospital Length of Stay: 0 days  Code Status: Full Code   Attending Provider: Lavern New,*   Consulting Provider: Breann Harrell MD  Primary Care Physician: Shobha Mendes MD  Principal Problem:GI bleed    Inpatient consult to Gastroenterology  Consult performed by: Breann Harrell MD  Consult ordered by: Jennifer Hallman NP  Reason for consult: post-polypectomy bleed        Subjective:     HPI:  Pleasant 48 y.o female underwent screening colonoscopy and had a large 50mm pedunculated polyp removed from the hepatic flexure. Endoclips were placed at the stalk. A few hours later she presented to [facility] after experiencing multiple bouts of painless hematochezia. Patient had eaten gumbo post procedure. Labs on presentation showed a normal H&H 13.1/40.7. CTA negative. Witnessed syncopal event during a bowel movement occurred in the ED. There was no postictal symptoms despite concerns for seizure like activity. Patient was admitted overnight and given a bottle of mag citrate.     Patient seen this morning and denies abdominal pain. She tolerated the mag citrate and is passing mostly clear with some red output. She remains NPO. Repeat H&H 11.3/35.7. Discussed repeating colonoscopy. She is amenable.     Past Medical History:   Diagnosis Date    Anxiety disorder, unspecified     Hypertension     Kidney stones 01/23/2023       Past Surgical History:   Procedure Laterality Date    BREAST BIOPSY Left 2018    COLONOSCOPY N/A 2/19/2024    Procedure: COLONOSCOPY;  Surgeon: Julissa Dupree MD;  Location: [facility];  Service: Endoscopy;  Laterality: N/A;    HERNIA REPAIR      kidney stones         Review of patient's allergies indicates:   Allergen Reactions    Ace inhibitors Anaphylaxis, Shortness Of Breath and Swelling     Other reaction(s): Reaction:Facial swelling;  "Alternate Reaction: Anaphylaxis;     Family History       Problem Relation (Age of Onset)    Diabetes Mother, Maternal Grandmother, Maternal Grandfather          Tobacco Use    Smoking status: Some Days     Types: Cigarettes    Smokeless tobacco: Never   Substance and Sexual Activity    Alcohol use: No    Drug use: No    Sexual activity: Yes     Review of Systems   Respiratory:  Negative for shortness of breath.    Cardiovascular:  Negative for chest pain.   Gastrointestinal:  Positive for blood in stool. Negative for abdominal pain.   All other systems reviewed and are negative.    Objective:     Vital Signs (Most Recent):  Temp: 98.6 °F (37 °C) (02/20/24 0438)  Pulse: 78 (02/20/24 0438)  Resp: 18 (02/20/24 0438)  BP: 102/68 (02/20/24 0439)  SpO2: 99 % (02/20/24 0438) Vital Signs (24h Range):  Temp:  [96.8 °F (36 °C)-98.6 °F (37 °C)] 98.6 °F (37 °C)  Pulse:  [] 78  Resp:  [15-20] 18  SpO2:  [98 %-100 %] 99 %  BP: (102-160)/(68-95) 102/68     Weight: 118 kg (260 lb 2.3 oz) (02/20/24 0438)  Body mass index is 38.42 kg/m².    No intake or output data in the 24 hours ending 02/20/24 0632    Lines/Drains/Airways       Peripheral Intravenous Line  Duration                  Peripheral IV - Single Lumen 02/19/24 2010 20 G Left Antecubital <1 day                     Physical Exam  Vitals and nursing note reviewed.   Constitutional:       Appearance: She is overweight.   Neurological:      Mental Status: She is alert.          Significant Labs:  CBC:   Recent Labs   Lab 02/19/24 2001 02/20/24 0422   WBC 6.03 10.09   HGB 13.1 11.3*   HCT 40.7 35.7*    298     CMP:   Recent Labs   Lab 02/20/24 0421      CALCIUM 8.8   ALBUMIN 3.6   PROT 6.0      K 4.2   CO2 23      BUN 7   CREATININE 0.8   ALKPHOS 96   ALT 18   AST 16   BILITOT 0.5     Coagulation: No results for input(s): "PT", "INR", "APTT" in the last 48 hours.    Significant Imaging:  Imaging results within the past 24 hours have been " reviewed.  Assessment/Plan:     GI  * GI bleed  Post-polypectomy bleed  Multiple bouts of painless hematochezia  Syncopal event with BM in the ED  Slight drop in counts  Remains hemodynamically stable      Recommendations:  Colonoscopy this morning  Keep NPO      Thank you for your consult. I will follow-up with patient. Please contact us if you have any additional questions.    Breann Harrell MD  Gastroenterology  [facility] S[facility]

## 2024-02-20 NOTE — ASSESSMENT & PLAN NOTE
Post-polypectomy bleed  Multiple bouts of painless hematochezia  Syncopal event with BM in the ED  Slight drop in counts  Remains hemodynamically stable

## 2024-02-20 NOTE — HPI
Bessy Egan is a 48 y.o. female with a PMH  has a past medical history of Anxiety disorder, unspecified, Hypertension, and Kidney stones (01/23/2023). who presented to the ED for further evaluation following multiple bright red bloody bowel movements since 3 p.m. Patient recently underwent colonoscopy with polypectomy by Dr. Dupree at the [place] earlier today and was doing well until onset of symptoms. Patient reported have multiple non-painful bright red bloody bowel movements after returning home from her colonoscopy making her concerned and prompting her to come to the ED to be further evaluated. She denied taking any antiplatelet or anticoagulants and stated she only ate gumbo for lunch. While in the ED, patient reported having bloody bowel movement and suffered a syncope event in triage while have her blood drawn followed seizure like activity. She denied prior history of syncope events or known seizures and reported feeling flushed, diaphoretic, nauseous, and palpitations prior to passing out. Prior to onset symptoms, patient reported being in her usual state of health with no other concerns or complaints.  All other review of systems negative except as noted above.  Initial workup in the ED fairly unremarkable.  Patient remains hemodynamically stable, H/H 13.1/40.7, CTA negative for active GI bleeding but positive for submucosal hyperenhancement around clips. GI consulted by ED staff who recommended bowel prep and admission to medicine with repeat colonoscopy tomorrow.  Patient admitted to Hospital Medicine under observation for continued medical management.       PCP: Shobha Mendes

## 2024-02-20 NOTE — DISCHARGE SUMMARY
[facility] Medicine  Discharge Summary      Patient Name: Bessy Egan  MRN: [id number]  [facility]: [id number]  Patient Class: OP- Observation  Admission Date: 2/19/2024  Hospital Length of Stay: 0 days  Discharge Date and Time: 2/20/24  Attending Physician: No att. providers found   Discharging Provider: Lavern New MD  Primary Care Provider: Shobha Mendes MD    Primary Care Team: Networked reference to record PCT     HPI:   Bessy Egan is a 48 y.o. female with a PMH  has a past medical history of Anxiety disorder, unspecified, Hypertension, and Kidney stones (01/23/2023). who presented to the ED for further evaluation following multiple bright red bloody bowel movements since 3 p.m. Patient recently underwent colonoscopy with polypectomy by Dr. Dupree at the [place] earlier today and was doing well until onset of symptoms. Patient reported have multiple non-painful bright red bloody bowel movements after returning home from her colonoscopy making her concerned and prompting her to come to the ED to be further evaluated. She denied taking any antiplatelet or anticoagulants and stated she only ate gumbo for lunch. While in the ED, patient reported having bloody bowel movement and suffered a syncope event in triage while have her blood drawn followed seizure like activity. She denied prior history of syncope events or known seizures and reported feeling flushed, diaphoretic, nauseous, and palpitations prior to passing out. Prior to onset symptoms, patient reported being in her usual state of health with no other concerns or complaints.  All other review of systems negative except as noted above.  Initial workup in the ED fairly unremarkable.  Patient remains hemodynamically stable, H/H 13.1/40.7, CTA negative for active GI bleeding but positive for submucosal hyperenhancement around clips. GI consulted by ED staff who recommended bowel prep and admission to medicine with  repeat colonoscopy tomorrow.  Patient admitted to Hospital Medicine under observation for continued medical management.       PCP: Shobha Mendes      Procedure(s) (LRB):  COLONOSCOPY (N/A)      Hospital Course:   Bessy Egan is a 48 y.o. female with a PMH has a past medical history of Anxiety disorder, unspecified, Hypertension, and Kidney stones (01/23/2023). who presented to the ED for further evaluation following multiple bright red bloody bowel movements since 3 p.m. Patient recently underwent colonoscopy with polypectomy by Dr. Dupree at the [place] earlier today and was doing well until onset of symptoms. Patient reported have multiple non-painful bright red bloody bowel movements after returning home from her colonoscopy making her concerned and prompting her to come to the ED to be further evaluated. She denied taking any antiplatelet or anticoagulants and stated she only ate gumbo for lunch. While in the ED, patient reported having bloody bowel movement and suffered a syncope event in triage while have her blood drawn followed seizure like activity. She denied prior history of syncope events or known seizures and reported feeling flushed, diaphoretic, nauseous, and palpitations prior to passing out. Prior to onset symptoms, patient reported being in her usual state of health with no other concerns or complaints. All other review of systems negative except as noted above. Initial workup in the ED fairly unremarkable. Patient remains hemodynamically stable, H/H 13.1/40.7, CTA negative for active GI bleeding but positive for submucosal hyperenhancement around clips. GI consulted by ED staff who recommended bowel prep and admission to medicine with repeat colonoscopy tomorrow. Patient admitted to Hospital Medicine under observation for continued medical management.     On 02/20/2024,-status post colonoscopy this a.m. with findings per GI-  noted polyp stalk with 2 clips in place. Injected  vessel with epi and snare stalk to base. Stalk and 2 clips retrieved. Placed new 4 clips and tattoo'd area.  Deemed stable for discharge from GI standpoint, recommended avoid NSAIDs including aspirin/heavy lifting for 3 weeks.    Remained afebrile, no leukocytosis, hemodynamically stable.    Post colonoscopy patient had bowel movement without further episodes of GI bleed.    Tolerated diet without issues.  H&H stable   Considering clinical and hemodynamic stability, clearance from GI, planning to discharge patient today, emphasized on compliance with medications, follow-up visits with PCP/GI upon discharge, to avoid NSAIDs including aspirin, avoid heavy lifting for 3 weeks- patient expressed understanding, agreed to the plan.    Discharge accordingly today      Goals of Care Treatment Preferences:  Code Status: Full Code      Consults:   Consults (From admission, onward)          Status Ordering Provider     Inpatient consult to Gastroenterology  Once        Provider:  Breann Harrell MD    Completed FIFI REYES            No new Assessment & Plan notes have been filed under this hospital service since the last note was generated.  Service: Hospital Medicine    Final Active Diagnoses:    Diagnosis Date Noted POA    PRINCIPAL PROBLEM:  GI bleed [K92.2] 02/19/2024 Yes    Hypertension [I10] 02/20/2024 Yes    Anxiety [F41.9] 02/20/2024 Yes    HLD (hyperlipidemia) [E78.5] 02/20/2024 Yes    Syncope [R55] 02/19/2024 Yes      Problems Resolved During this Admission:       Discharged Condition: good    Disposition: Home or Self Care    Follow Up:   Follow-up Information       Shobha Mendes MD Follow up in 1 week(s).    Specialty: Family Medicine  Contact information:  [address] [place] [address]  [phone]               Julissa Dupree MD Follow up in 2 week(s).    Specialties: Gastroenterology, Internal Medicine  Contact information:  [address] [place]  [address]  [phone]                           Patient Instructions:   No discharge procedures on file.    Significant Diagnostic Studies:     Results for orders placed or performed during the hospital encounter of 02/19/24   CBC auto differential   Result Value Ref Range    WBC 6.03 3.90 - 12.70 K/uL    RBC 4.76 4.00 - 5.40 M/uL    Hemoglobin 13.1 12.0 - 16.0 g/dL    Hematocrit 40.7 37.0 - 48.5 %    MCV 86 82 - 98 fL    MCH 27.5 27.0 - 31.0 pg    MCHC 32.2 32.0 - 36.0 g/dL    RDW 16.6 (H) 11.5 - 14.5 %    Platelets 340 150 - 450 K/uL    MPV 10.4 9.2 - 12.9 fL    Immature Granulocytes 0.3 0.0 - 0.5 %    Gran # (ANC) 3.4 1.8 - 7.7 K/uL    Immature Grans (Abs) 0.02 0.00 - 0.04 K/uL    Lymph # 2.0 1.0 - 4.8 K/uL    Mono # 0.5 0.3 - 1.0 K/uL    Eos # 0.1 0.0 - 0.5 K/uL    Baso # 0.05 0.00 - 0.20 K/uL    nRBC 0 0 /100 WBC    Gran % 55.7 38.0 - 73.0 %    Lymph % 33.7 18.0 - 48.0 %    Mono % 7.8 4.0 - 15.0 %    Eosinophil % 1.7 0.0 - 8.0 %    Basophil % 0.8 0.0 - 1.9 %    Differential Method Automated    Comprehensive metabolic panel   Result Value Ref Range    Sodium 141 136 - 145 mmol/L    Potassium 3.5 3.5 - 5.1 mmol/L    Chloride 106 95 - 110 mmol/L    CO2 22 (L) 23 - 29 mmol/L    Glucose 125 (H) 70 - 110 mg/dL    BUN 8 6 - 20 mg/dL    Creatinine 0.9 0.5 - 1.4 mg/dL    Calcium 9.1 8.7 - 10.5 mg/dL    Total Protein 7.0 6.0 - 8.4 g/dL    Albumin 3.9 3.5 - 5.2 g/dL    Total Bilirubin 0.5 0.1 - 1.0 mg/dL    Alkaline Phosphatase 110 55 - 135 U/L    AST 20 10 - 40 U/L    ALT 21 10 - 44 U/L    eGFR >60 >60 mL/min/1.73 m^2    Anion Gap 13 8 - 16 mmol/L   Comprehensive Metabolic Panel (CMP)   Result Value Ref Range    Sodium 139 136 - 145 mmol/L    Potassium 4.2 3.5 - 5.1 mmol/L    Chloride 107 95 - 110 mmol/L    CO2 23 23 - 29 mmol/L    Glucose 110 70 - 110 mg/dL    BUN 7 6 - 20 mg/dL    Creatinine 0.8 0.5 - 1.4 mg/dL    Calcium 8.8 8.7 - 10.5 mg/dL    Total Protein 6.0 6.0 - 8.4 g/dL    Albumin 3.6 3.5 - 5.2 g/dL    Total  Bilirubin 0.5 0.1 - 1.0 mg/dL    Alkaline Phosphatase 96 55 - 135 U/L    AST 16 10 - 40 U/L    ALT 18 10 - 44 U/L    eGFR >60 >60 mL/min/1.73 m^2    Anion Gap 9 8 - 16 mmol/L   CBC with Automated Differential   Result Value Ref Range    WBC 10.09 3.90 - 12.70 K/uL    RBC 4.17 4.00 - 5.40 M/uL    Hemoglobin 11.3 (L) 12.0 - 16.0 g/dL    Hematocrit 35.7 (L) 37.0 - 48.5 %    MCV 86 82 - 98 fL    MCH 27.1 27.0 - 31.0 pg    MCHC 31.7 (L) 32.0 - 36.0 g/dL    RDW 16.4 (H) 11.5 - 14.5 %    Platelets 298 150 - 450 K/uL    MPV 10.3 9.2 - 12.9 fL    Immature Granulocytes 0.3 0.0 - 0.5 %    Gran # (ANC) 7.3 1.8 - 7.7 K/uL    Immature Grans (Abs) 0.03 0.00 - 0.04 K/uL    Lymph # 2.0 1.0 - 4.8 K/uL    Mono # 0.6 0.3 - 1.0 K/uL    Eos # 0.1 0.0 - 0.5 K/uL    Baso # 0.05 0.00 - 0.20 K/uL    nRBC 0 0 /100 WBC    Gran % 72.5 38.0 - 73.0 %    Lymph % 19.9 18.0 - 48.0 %    Mono % 6.3 4.0 - 15.0 %    Eosinophil % 0.5 0.0 - 8.0 %    Basophil % 0.5 0.0 - 1.9 %    Differential Method Automated         Imaging Results               CTA Acute GI [place], Abdomen and Pelvis (Final result)  Result time 02/19/24 21:19:16   Procedure changed from CT Abdomen Pelvis With IV Contrast NO Oral Contrast     Final result by Ronan Starr MD (02/19/24 21:19:16)                   Impression:      No definite ongoing arterial GI bleeding.    Submucosal hyperenhancement about the clips possibly postprocedural.  Correlation and further evaluation as warranted.    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    Complete findings as above.    This report was flagged in Epic as abnormal.    All CT scans at this facility are performed  using dose modulation techniques as appropriate to performed exam including the following:  automated exposure control; adjustment of mA and/or kV according to the patients size (this includes  techniques or standardized protocols for targeted exams where dose is matched to indication/reason for exam: i.e. extremities or head);  iterative reconstruction technique.      Electronically signed by: Ronan Starr  Date:    02/19/2024  Time:    21:19               Narrative:    EXAMINATION:  CTA ACUTE GI BLEED, ABDOMEN AND PELVIS    CLINICAL HISTORY:  GI bleed;GI BLEED;    TECHNIQUE:  Low dose axial images, sagittal and coronal reformations were obtained from the lung bases to the pubic symphysis.  Contrast was not administered.    COMPARISON:  None    FINDINGS:  Heart: Normal in size. No pericardial effusion.    Lung Bases: Well aerated, without consolidation or pleural fluid.    Liver: Normal in size and attenuation, with no focal hepatic lesions.    Gallbladder: No calcified gallstones.    Bile Ducts: No evidence of dilated ducts.    Pancreas: No mass or peripancreatic fat stranding.    Spleen: Unremarkable.    Adrenals: Unremarkable.    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  Left kidney is better preserved.    Bladder: No evidence of wall thickening.    Reproductive organs: Uterine fibroids.    GI Tract/Mesentery: No evidence of bowel obstruction or inflammation.  Clips in the hepatic flexure of the colon.  No definite ongoing arterial bleeding.  Diverticulosis without diverticulitis.    No definite ongoing arterial GI bleeding.  Some mucosal hyperenhancement about the clips possibly postprocedural.    Peritoneal Space: No ascites. No free air.    Retroperitoneum: No significant adenopathy.    Abdominal wall: Unremarkable.    Vasculature: No significant atherosclerosis or aneurysm.    Bones: No acute fracture.                                       Pending Diagnostic Studies:       Procedure Component Value Units Date/Time    Specimen to Pathology, Surgery  Gastrointestinal tract [[id number]] Collected: 02/20/24 0733    Order Status: Sent Lab Status: In process Updated: 02/20/24 1125    Specimen: Tissue            Medications:       Medication List        CONTINUE taking these medications      ALPRAZolam 2 MG Tab  Commonly known as: XANAX  Take 1 tablet (2 mg total) by mouth 2 (two) times daily as needed (anxiety).     amlodipine-olmesartan 5-20 mg per tablet  Commonly known as: ZACKARY  Take 1 tablet by mouth once daily.     buPROPion 150 MG TB24 tablet  Commonly known as: WELLBUTRIN XL     fluconazole 200 MG Tab  Commonly known as: DIFLUCAN  1 po bi-weekly x 2 weeks     levocetirizine 5 MG tablet  Commonly known as: XYZAL  Take 1 tablet (5 mg total) by mouth every evening.     montelukast 10 mg tablet  Commonly known as: SINGULAIR  Take 1 tablet (10 mg total) by mouth once daily.     rosuvastatin 10 MG tablet  Commonly known as: CRESTOR  Take 1 tablet (10 mg total) by mouth once daily.            STOP taking these medications      ketoconazole 2 % Foam     metroNIDAZOLE 500 MG tablet  Commonly known as: FLAGYL               Indwelling Lines/Drains at time of discharge:   Lines/Drains/Airways       None                   Time spent on the discharge of patient: 91 minutes         Lavern New MD  Department of Hospital Medicine  [facility] [facility]

## 2024-02-20 NOTE — NURSING
Patient free from falls or injury. Patient drink Mag Citrate for colonoscopy this morning. Patient states stool is bloody and liquid. No clots present. Patient c/o anxiety last night. PRN Xanax was ordered. Patient NPO since five. No new orders.

## 2024-02-20 NOTE — ASSESSMENT & PLAN NOTE
Patient is chronically on statin.will continue for now. Last Lipid Panel:   Lab Results   Component Value Date    CHOL 216 (H) 01/29/2024    HDL 81 (H) 01/29/2024    LDLCALC 105.6 01/29/2024    TRIG 147 01/29/2024    CHOLHDL 37.5 01/29/2024   Plan:  -Continue home medication  -low fat/low calorie diet when not NPO

## 2024-02-20 NOTE — ED NOTES
Bessy had syncopal episode while sitting in wheelchair during transport from [facility] to Room #3.   The episode lasted less than 10 seconds and she quickly regained consciousness.     Provider Dewey Salazar N.P. onsite and assisted w/ transfer to ED room #3.     Report given to Margaret ASHBY.

## 2024-02-21 VITALS
OXYGEN SATURATION: 99 % | SYSTOLIC BLOOD PRESSURE: 118 MMHG | HEART RATE: 78 BPM | WEIGHT: 260 LBS | HEIGHT: 69 IN | RESPIRATION RATE: 16 BRPM | BODY MASS INDEX: 38.51 KG/M2 | TEMPERATURE: 97 F | DIASTOLIC BLOOD PRESSURE: 72 MMHG

## 2024-02-21 LAB
FINAL PATHOLOGIC DIAGNOSIS: NORMAL
GROSS: NORMAL
Lab: NORMAL

## 2024-02-26 ENCOUNTER — OFFICE VISIT (OUTPATIENT)
Dept: INTERNAL MEDICINE | Facility: CLINIC | Age: 49
End: 2024-02-26
Payer: COMMERCIAL

## 2024-02-26 VITALS
HEART RATE: 109 BPM | DIASTOLIC BLOOD PRESSURE: 86 MMHG | OXYGEN SATURATION: 95 % | SYSTOLIC BLOOD PRESSURE: 120 MMHG | TEMPERATURE: 97 F | WEIGHT: 186.94 LBS | BODY MASS INDEX: 27.61 KG/M2

## 2024-02-26 DIAGNOSIS — K92.2 GASTROINTESTINAL HEMORRHAGE, UNSPECIFIED GASTROINTESTINAL HEMORRHAGE TYPE: ICD-10-CM

## 2024-02-26 DIAGNOSIS — I10 PRIMARY HYPERTENSION: ICD-10-CM

## 2024-02-26 DIAGNOSIS — Z09 HOSPITAL DISCHARGE FOLLOW-UP: Primary | ICD-10-CM

## 2024-02-26 PROCEDURE — 1159F MED LIST DOCD IN RCRD: CPT | Mod: CPTII,S$GLB,, | Performed by: NURSE PRACTITIONER

## 2024-02-26 PROCEDURE — 99999 PR PBB SHADOW E&M-EST. PATIENT-LVL III: CPT | Mod: PBBFAC,,, | Performed by: NURSE PRACTITIONER

## 2024-02-26 PROCEDURE — 3044F HG A1C LEVEL LT 7.0%: CPT | Mod: CPTII,S$GLB,, | Performed by: NURSE PRACTITIONER

## 2024-02-26 PROCEDURE — 99213 OFFICE O/P EST LOW 20 MIN: CPT | Mod: S$GLB,,, | Performed by: NURSE PRACTITIONER

## 2024-02-26 PROCEDURE — 4010F ACE/ARB THERAPY RXD/TAKEN: CPT | Mod: CPTII,S$GLB,, | Performed by: NURSE PRACTITIONER

## 2024-02-26 PROCEDURE — 1160F RVW MEDS BY RX/DR IN RCRD: CPT | Mod: CPTII,S$GLB,, | Performed by: NURSE PRACTITIONER

## 2024-02-26 PROCEDURE — 3074F SYST BP LT 130 MM HG: CPT | Mod: CPTII,S$GLB,, | Performed by: NURSE PRACTITIONER

## 2024-02-26 PROCEDURE — 3079F DIAST BP 80-89 MM HG: CPT | Mod: CPTII,S$GLB,, | Performed by: NURSE PRACTITIONER

## 2024-02-26 NOTE — PROGRESS NOTES
Subjective:       Patient ID: Bessy Egan is a 48 y.o. female.    Chief Complaint: Hospital Follow Up    Pt presents to clinic today for hospital follow up  She had her colonoscopy done on 2/19  That afternoon she had multiple Bms with blood  She went to the ER as advised  She was admitted overnight  Had repeat cscope done the next morning and the areas of bleeding addressed  Since discharge she reports she is doing well  No more Bms with blood  Tolerating regular diet, normal Bms  Needs 2 week follow up with GI    Transitional Care Note    Family and/or Caretaker present at visit?  no  Diagnostic tests reviewed/disposition: No diagnosic tests pending after this hospitalization.  Disease/illness education: Gi bleed  Home health/community services discussion/referrals: Patient does not have home health established from hospital visit.  They do not need home health.  If needed, we will set up home health for the patient.   Establishment or re-establishment of referral orders for community resources: No other necessary community resources.   Discussion with other health care providers: No discussion with other health care providers necessary.                /86   Pulse 109   Temp 96.8 °F (36 °C)   Wt 84.8 kg (186 lb 15.2 oz)   SpO2 95%   BMI 27.61 kg/m²     Review of Systems   Constitutional:  Negative for activity change, appetite change, chills, diaphoresis, fatigue, fever and unexpected weight change.   HENT: Negative.     Respiratory:  Negative for cough and shortness of breath.    Cardiovascular:  Negative for chest pain, palpitations and leg swelling.   Gastrointestinal: Negative.    Genitourinary: Negative.    Musculoskeletal: Negative.    Skin:  Negative for color change, pallor, rash and wound.   Allergic/Immunologic: Negative for immunocompromised state.   Neurological: Negative.  Negative for dizziness and facial asymmetry.   Hematological:  Negative for adenopathy. Does not bruise/bleed  easily.   Psychiatric/Behavioral:  Negative for agitation, behavioral problems and confusion.        Objective:      Physical Exam  Vitals and nursing note reviewed.   Constitutional:       General: She is not in acute distress.     Appearance: Normal appearance. She is well-developed. She is not diaphoretic.   HENT:      Head: Normocephalic and atraumatic.   Cardiovascular:      Rate and Rhythm: Normal rate and regular rhythm.      Heart sounds: Normal heart sounds. No murmur heard.  Pulmonary:      Effort: Pulmonary effort is normal. No respiratory distress.      Breath sounds: Normal breath sounds.   Musculoskeletal:         General: Normal range of motion.   Skin:     General: Skin is warm and dry.      Findings: No rash.   Neurological:      Mental Status: She is alert.   Psychiatric:         Mood and Affect: Mood normal.         Behavior: Behavior normal. Behavior is cooperative.         Thought Content: Thought content normal.         Judgment: Judgment normal.         Assessment:       1. Hospital discharge follow-up    2. Gastrointestinal hemorrhage, unspecified gastrointestinal hemorrhage type    3. Primary hypertension    4. BMI 27.0-27.9,adult        Plan:       Bessy was seen today for hospital follow up.    Diagnoses and all orders for this visit:    Hospital discharge follow-up    Gastrointestinal hemorrhage, unspecified gastrointestinal hemorrhage type    Primary hypertension    BMI 27.0-27.9,adult      Doing well since being out of the hospital   No further bleeding  Continue to avoid nsaids as discussed  Follow up with GI as advised  Keep your follow up with DR. Morris as scheduled and PRN

## 2024-02-28 LAB
FINAL PATHOLOGIC DIAGNOSIS: NORMAL
GROSS: NORMAL
Lab: NORMAL
SUPPLEMENTAL DIAGNOSIS: NORMAL

## 2024-03-05 DIAGNOSIS — Z86.010 PERSONAL HISTORY OF COLONIC POLYPS: Primary | ICD-10-CM

## 2024-03-06 ENCOUNTER — TELEPHONE (OUTPATIENT)
Dept: GASTROENTEROLOGY | Facility: CLINIC | Age: 49
End: 2024-03-06
Payer: COMMERCIAL

## 2024-03-06 NOTE — TELEPHONE ENCOUNTER
informed that she would be receiving a call on 03/07/24 to schedule her repeat colonoscopy,Pt verbalized understanding

## 2024-03-06 NOTE — TELEPHONE ENCOUNTER
----- Message from Julissa Dupree MD sent at 3/5/2024  9:26 AM CST -----  Contact: pt  Patient called with results. Recommend a repeat colonoscopy in 3-6 months for recheck of polyp site due to dysplasia (although felt to be completely removed based on path). Procedure ordered- please assist with scheduling. Thanks    ----- Message -----  From: Milligan, Toni, MA  Sent: 3/4/2024   4:31 PM CST  To: Julissa Dupree MD      ----- Message -----  From: Nicole Farr  Sent: 3/4/2024   2:48 PM CST  To: Joon Patel    Pt is calling in regard to her colonoscopy she had on 2/20 and has to return the same day due ot hemorrhaging.  Pt states no one has called her back with pathology report.  Please call her back at [phone] thanks/mpd

## 2024-03-07 ENCOUNTER — HOSPITAL ENCOUNTER (OUTPATIENT)
Dept: PREADMISSION TESTING | Facility: HOSPITAL | Age: 49
Discharge: HOME | End: 2024-03-07
Attending: INTERNAL MEDICINE
Payer: COMMERCIAL

## 2024-03-07 DIAGNOSIS — Z86.010 PERSONAL HISTORY OF COLONIC POLYPS: ICD-10-CM

## 2024-03-11 PROBLEM — K21.9 GASTROESOPHAGEAL REFLUX DISEASE: Status: ACTIVE | Noted: 2024-03-11

## 2024-03-11 PROBLEM — N20.9 UROLITHIASIS: Status: ACTIVE | Noted: 2024-03-11

## 2024-03-11 PROBLEM — T78.3XXA ACE INHIBITOR-AGGRAVATED ANGIOEDEMA: Status: ACTIVE | Noted: 2017-01-16

## 2024-03-11 PROBLEM — T46.4X5A ACE INHIBITOR-AGGRAVATED ANGIOEDEMA: Status: ACTIVE | Noted: 2017-01-16

## 2024-04-29 ENCOUNTER — HOSPITAL ENCOUNTER (OUTPATIENT)
Dept: RADIOLOGY | Facility: HOSPITAL | Age: 49
Discharge: HOME | End: 2024-04-29
Attending: FAMILY MEDICINE
Payer: COMMERCIAL

## 2024-04-29 ENCOUNTER — OFFICE VISIT (OUTPATIENT)
Dept: INTERNAL MEDICINE | Facility: CLINIC | Age: 49
End: 2024-04-29
Payer: COMMERCIAL

## 2024-04-29 VITALS
BODY MASS INDEX: 28.31 KG/M2 | TEMPERATURE: 98 F | OXYGEN SATURATION: 98 % | WEIGHT: 191.13 LBS | HEIGHT: 69 IN | DIASTOLIC BLOOD PRESSURE: 80 MMHG | HEART RATE: 95 BPM | SYSTOLIC BLOOD PRESSURE: 130 MMHG

## 2024-04-29 DIAGNOSIS — B36.0 TINEA VERSICOLOR: Chronic | ICD-10-CM

## 2024-04-29 DIAGNOSIS — G89.29 CHRONIC HEEL PAIN, RIGHT: ICD-10-CM

## 2024-04-29 DIAGNOSIS — F41.1 GENERALIZED ANXIETY DISORDER: Chronic | ICD-10-CM

## 2024-04-29 DIAGNOSIS — K92.2 GASTROINTESTINAL HEMORRHAGE, UNSPECIFIED GASTROINTESTINAL HEMORRHAGE TYPE: Primary | ICD-10-CM

## 2024-04-29 DIAGNOSIS — M79.671 CHRONIC HEEL PAIN, RIGHT: ICD-10-CM

## 2024-04-29 DIAGNOSIS — I10 PRIMARY HYPERTENSION: Primary | ICD-10-CM

## 2024-04-29 DIAGNOSIS — L81.9 DYSCHROMIA: Chronic | ICD-10-CM

## 2024-04-29 DIAGNOSIS — E78.5 HYPERLIPIDEMIA, UNSPECIFIED HYPERLIPIDEMIA TYPE: ICD-10-CM

## 2024-04-29 DIAGNOSIS — R73.03 PREDIABETES: ICD-10-CM

## 2024-04-29 PROCEDURE — 3075F SYST BP GE 130 - 139MM HG: CPT | Mod: CPTII,S$GLB,, | Performed by: FAMILY MEDICINE

## 2024-04-29 PROCEDURE — 73630 X-RAY EXAM OF FOOT: CPT | Mod: TC,FY,PO,RT

## 2024-04-29 PROCEDURE — 1159F MED LIST DOCD IN RCRD: CPT | Mod: CPTII,S$GLB,, | Performed by: FAMILY MEDICINE

## 2024-04-29 PROCEDURE — 1160F RVW MEDS BY RX/DR IN RCRD: CPT | Mod: CPTII,S$GLB,, | Performed by: FAMILY MEDICINE

## 2024-04-29 PROCEDURE — 3079F DIAST BP 80-89 MM HG: CPT | Mod: CPTII,S$GLB,, | Performed by: FAMILY MEDICINE

## 2024-04-29 PROCEDURE — 99999 PR PBB SHADOW E&M-EST. PATIENT-LVL IV: CPT | Mod: PBBFAC,,, | Performed by: FAMILY MEDICINE

## 2024-04-29 PROCEDURE — 3008F BODY MASS INDEX DOCD: CPT | Mod: CPTII,S$GLB,, | Performed by: FAMILY MEDICINE

## 2024-04-29 PROCEDURE — 99214 OFFICE O/P EST MOD 30 MIN: CPT | Mod: S$GLB,,, | Performed by: FAMILY MEDICINE

## 2024-04-29 PROCEDURE — 4010F ACE/ARB THERAPY RXD/TAKEN: CPT | Mod: CPTII,S$GLB,, | Performed by: FAMILY MEDICINE

## 2024-04-29 PROCEDURE — 73630 X-RAY EXAM OF FOOT: CPT | Mod: 26,RT,, | Performed by: RADIOLOGY

## 2024-04-29 PROCEDURE — 3044F HG A1C LEVEL LT 7.0%: CPT | Mod: CPTII,S$GLB,, | Performed by: FAMILY MEDICINE

## 2024-04-29 RX ORDER — ALPRAZOLAM 2 MG/1
2 TABLET ORAL 2 TIMES DAILY PRN
Qty: 60 TABLET | Refills: 0 | Status: SHIPPED | OUTPATIENT
Start: 2024-04-29

## 2024-04-29 RX ORDER — FLUCONAZOLE 200 MG/1
TABLET ORAL
Qty: 24 TABLET | Refills: 1 | Status: SHIPPED | OUTPATIENT
Start: 2024-04-29

## 2024-04-29 NOTE — PROGRESS NOTES
Subjective     Patient ID: Bessy Egan is a 48 y.o. female.    Chief Complaint: No chief complaint on file.    History of Present Illness                 Objective     Physical Exam    Physical Exam                 Assessment and Plan     1. Primary hypertension  -     CBC W/ AUTO DIFFERENTIAL; Future; Expected date: 10/29/2024  -     COMPREHENSIVE METABOLIC PANEL; Future; Expected date: 10/29/2024    2. Hyperlipidemia, unspecified hyperlipidemia type  -     LIPID PANEL; Future; Expected date: 10/29/2024    3. Prediabetes  -     HEMOGLOBIN A1C; Future; Expected date: 10/29/2024        Assessment & Plan                   No follow-ups on file.    This note was generated with the assistance of ambient listening technology. Verbal consent was obtained by the patient and accompanying visitor(s) for the recording of patient appointment to facilitate this note. I attest to having reviewed and edited the generated note for accuracy, though some syntax or spelling errors may persist. Please contact the author of this note for any clarification.
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
## 2024-05-16 ENCOUNTER — OFFICE VISIT (OUTPATIENT)
Dept: PODIATRY | Facility: CLINIC | Age: 49
End: 2024-05-16
Attending: FAMILY MEDICINE
Payer: COMMERCIAL

## 2024-05-16 VITALS — BODY MASS INDEX: 28.31 KG/M2 | HEIGHT: 69 IN | WEIGHT: 191.13 LBS

## 2024-05-16 DIAGNOSIS — M21.6X1 ACQUIRED EQUINUS DEFORMITY OF RIGHT FOOT: Primary | ICD-10-CM

## 2024-05-16 DIAGNOSIS — G89.29 CHRONIC HEEL PAIN, RIGHT: ICD-10-CM

## 2024-05-16 DIAGNOSIS — M79.671 CHRONIC HEEL PAIN, RIGHT: ICD-10-CM

## 2024-05-16 PROCEDURE — 3044F HG A1C LEVEL LT 7.0%: CPT | Mod: CPTII,S$GLB,, | Performed by: PODIATRIST

## 2024-05-16 PROCEDURE — 99999 PR PBB SHADOW E&M-EST. PATIENT-LVL III: CPT | Mod: PBBFAC,,, | Performed by: PODIATRIST

## 2024-05-16 PROCEDURE — 3008F BODY MASS INDEX DOCD: CPT | Mod: CPTII,S$GLB,, | Performed by: PODIATRIST

## 2024-05-16 PROCEDURE — 1160F RVW MEDS BY RX/DR IN RCRD: CPT | Mod: CPTII,S$GLB,, | Performed by: PODIATRIST

## 2024-05-16 PROCEDURE — 99203 OFFICE O/P NEW LOW 30 MIN: CPT | Mod: S$GLB,,, | Performed by: PODIATRIST

## 2024-05-16 PROCEDURE — 1159F MED LIST DOCD IN RCRD: CPT | Mod: CPTII,S$GLB,, | Performed by: PODIATRIST

## 2024-05-16 PROCEDURE — 4010F ACE/ARB THERAPY RXD/TAKEN: CPT | Mod: CPTII,S$GLB,, | Performed by: PODIATRIST

## 2024-05-16 NOTE — PROGRESS NOTES
Subjective:     Patient ID: Bessy Egan is a 48 y.o. female.    Chief Complaint: Heel Pain (Pt c/o right heel pain 8/10 when hurting 3/10 right now, non-diabetic pt wears sandals, PCP Dr. Morris last seen 4-29-24)    Bessy is a 48 y.o. female who presents to the clinic complaining of heel pain in the right foot, especially at the end of day after standing for long period of time. The pain is described as Throbbing and Tight. The onset of the pain was gradual and has worsened over the past several months. Bessy rates the pain as 8/10. She denies a history of trauma. Prior treatments include Galeas tennis shoes. Patient does admit the Achilles tendon surgery about 13-14 years ago. Patient has no other pedal complaints at this time.     Patient Active Problem List   Diagnosis    Encounter for screening colonoscopy    GI bleed    Syncope    Hypertension    Anxiety    HLD (hyperlipidemia)    Urolithiasis    Gastroesophageal reflux disease    ACE inhibitor-aggravated angioedema       Medication List with Changes/Refills   Current Medications    ALPRAZOLAM (XANAX) 2 MG TAB    Take 1 tablet (2 mg total) by mouth 2 (two) times daily as needed (anxiety).    AMLODIPINE-OLMESARTAN (ZACKARY) 5-20 MG PER TABLET    Take 1 tablet by mouth once daily.    FEZOLINETANT (VEOZAH) 45 MG TAB    Take 45 mg by mouth once daily.    FLUCONAZOLE (DIFLUCAN) 200 MG TAB    1 po bi-weekly x 2 weeks    LEVOCETIRIZINE (XYZAL) 5 MG TABLET    Take 1 tablet (5 mg total) by mouth every evening.    MONTELUKAST (SINGULAIR) 10 MG TABLET    Take 1 tablet (10 mg total) by mouth once daily.    ROSUVASTATIN (CRESTOR) 10 MG TABLET    Take 1 tablet (10 mg total) by mouth once daily.       Review of patient's allergies indicates:   Allergen Reactions    Ace inhibitors Anaphylaxis, Shortness Of Breath and Swelling     Other reaction(s): Reaction:Facial swelling; Alternate Reaction: Anaphylaxis;       Past Surgical History:   Procedure Laterality Date    BREAST  BIOPSY Left 2018    COLONOSCOPY N/A 2/19/2024    Procedure: COLONOSCOPY;  Surgeon: Julissa Dupree MD;  Location: [facility] ENDO;  Service: Endoscopy;  Laterality: N/A;    COLONOSCOPY N/A 2/20/2024    Procedure: COLONOSCOPY;  Surgeon: Breann Harrell MD;  Location: [facility] ENDO;  Service: Endoscopy;  Laterality: N/A;    HERNIA REPAIR      kidney stones         Family History   Problem Relation Name Age of Onset    Diabetes Mother      Diabetes Maternal Grandmother      Diabetes Maternal Grandfather         Social History     Socioeconomic History    Marital status: Single   Tobacco Use    Smoking status: Some Days     Types: Cigarettes     Passive exposure: Current    Smokeless tobacco: Never   Substance and Sexual Activity    Alcohol use: Yes     Alcohol/week: 3.0 standard drinks of alcohol     Types: 3 Glasses of wine per week     Comment: weeekly    Drug use: No    Sexual activity: Not Currently   Other Topics Concern    Are you pregnant or think you may be? No    Breast-feeding No     Social Determinants of Health     Financial Resource Strain: High Risk (4/22/2024)    Overall Financial Resource Strain (CARDIA)     Difficulty of Paying Living Expenses: Hard   Food Insecurity: No Food Insecurity (4/22/2024)    Hunger Vital Sign     Worried About Running Out of Food in the Last Year: Never true     Ran Out of Food in the Last Year: Never true   Transportation Needs: No Transportation Needs (4/22/2024)    PRAPARE - Transportation     Lack of Transportation (Medical): No     Lack of Transportation (Non-Medical): No   Physical Activity: Sufficiently Active (4/22/2024)    Exercise Vital Sign     Days of Exercise per Week: 4 days     Minutes of Exercise per Session: 60 min   Stress: Stress Concern Present (4/22/2024)    [employer] - Occupational Stress Questionnaire     Feeling of Stress : Rather much   Housing Stability: Unknown (4/22/2024)    Housing Stability Vital Sign     Unable to  "Pay for Housing in the Last Year: No       Vitals:    05/16/24 1010   Weight: 86.7 kg (191 lb 2.2 oz)   Height: 5' 9" (1.753 m)   PainSc:   3       Hemoglobin A1C   Date Value Ref Range Status   01/29/2024 5.7 (H) 4.0 - 5.6 % Final     Comment:     ADA Screening Guidelines:  5.7-6.4%  Consistent with prediabetes  >or=6.5%  Consistent with diabetes    High levels of fetal hemoglobin interfere with the HbA1C  assay. Heterozygous hemoglobin variants (HbS, HgC, etc)do  not significantly interfere with this assay.   However, presence of multiple variants may affect accuracy.         Review of Systems   Constitutional:  Negative for chills and fever.   Respiratory:  Negative for shortness of breath.    Cardiovascular:  Negative for chest pain, palpitations, orthopnea, claudication and leg swelling.   Gastrointestinal:  Negative for diarrhea, nausea and vomiting.   Musculoskeletal:  Negative for joint pain.   Skin:  Negative for rash.   Neurological:  Negative for dizziness, tingling, sensory change, focal weakness and weakness.   Psychiatric/Behavioral: Negative.             Objective:      PHYSICAL EXAM: Apperance: Alert and orient in no distress,well developed, and with good attention to grooming and body habits  Patient presents ambulating in flip flops.  Lower Extremity Exam  VASCULAR: Dorsalis pedis pulses 2/4 right and Posterior Tibial pulses 2/4 right.  DERMATOLOGICAL: No skin rashes, subcutaneous nodules, lesions, or ulcers observed right. Well healed cicatrix noted to right posterior heel.   NEUROLOGICAL: Light touch, sharp-dull, proprioception all present and equal bilaterally.    MUSCULOSKELETAL: Muscle strength 5/5 for all foot inverters, everters, plantarflexors, and dorsiflexors bilateral. Ankle joints right shows decreased ROM. right ankle ROM shows greater decrease in dorsiflexion with knee extended. Ankle joint ROM is pain free and without crepitus right. No pain to palpation right plantar medial " tubercle. Plantar medial aspect of right heels shows tenderness to palpation. No pain on medial-lateral compression of the calcaneus.     TEST RESULTS: Radiographs of right foot/ankle taken 04/29/2024 reveals no fracture is identified. Joint alignment is anatomic. Mild first MTP osteoarthritis. No advanced arthritic changes are identified.           Assessment:       ICD-10-CM ICD-9-CM   1. Acquired equinus deformity of right foot - Right Foot  M21.6X1 736.72   2. Chronic heel pain, right - Right Foot  M79.671 729.5    G89.29 338.29       Plan:   Acquired equinus deformity of right foot - Right Foot    Chronic heel pain, right - Right Foot  -     Ambulatory referral/consult to Podiatry    I counseled the patient on her conditions, regarding findings of my examination, my impressions, and usual treatment plan.   Reviewed right foot x-rays in exam room with patient.   I explained to the patient that etiology and treatment options for heel pain including rest,  ice messages, stretching exercises, strappings/tappings, NSAID's, injections, new shoegear with orthotic inserts, and/or surgical treatment.   Patient agreed to stretching and insert therapy.   I gave written and verbal instructions on heel cord stretching and this was demonstrated for the patient. Patient expressed understanding.  Patient instructed on adequate icing techniques. Patient should ice the affected area at least once per day x 10 minutes for 10 days . I advised the  patient that extra icing would also be beneficial to ensure adequate anti inflammatory effect.   The patient and I reviewed the types of shoes she should be wearing, my recommendation includes generally the best time of the day for a shoe fitting is the afternoon, shoes with a wide toe box, very good cushion, and tennis shoes with removable inner soles. The patient and I reviewed my recommendations for over-the-counter orthotic inserts.   Patient to return in 4-6 weeks.        Rito  Miles, DPM [facility]

## 2024-05-27 ENCOUNTER — HOSPITAL ENCOUNTER (OUTPATIENT)
Dept: PREADMISSION TESTING | Facility: HOSPITAL | Age: 49
Discharge: HOME | End: 2024-05-27
Attending: INTERNAL MEDICINE
Payer: COMMERCIAL

## 2024-05-27 PROBLEM — K92.2 GI BLEED: Status: RESOLVED | Noted: 2024-02-19 | Resolved: 2024-05-27

## 2024-05-27 RX ORDER — SODIUM, POTASSIUM,MAG SULFATES 17.5-3.13G
1 SOLUTION, RECONSTITUTED, ORAL ORAL DAILY
Qty: 1 KIT | Refills: 0 | Status: SHIPPED | OUTPATIENT
Start: 2024-05-27 | End: 2024-05-29

## 2024-07-01 RX ORDER — AZELASTINE HYDROCHLORIDE 0.5 MG/ML
1 SOLUTION/ DROPS OPHTHALMIC 2 TIMES DAILY
COMMUNITY
Start: 2024-02-06

## 2024-07-23 ENCOUNTER — ANESTHESIA EVENT (OUTPATIENT)
Dept: ENDOSCOPY | Facility: HOSPITAL | Age: 49
End: 2024-07-23
Payer: COMMERCIAL

## 2024-07-23 NOTE — ANESTHESIA PREPROCEDURE EVALUATION
07/23/2024  Bessy Egan is a 48 y.o., female.  Past Medical History:   Diagnosis Date    Anxiety disorder, unspecified     Hypertension     Kidney stones 01/23/2023     Past Surgical History:   Procedure Laterality Date    BREAST BIOPSY Left 2018    COLONOSCOPY N/A 2/19/2024    Procedure: COLONOSCOPY;  Surgeon: Julissa Dupree MD;  Location: [facility] ENDO;  Service: Endoscopy;  Laterality: N/A;    COLONOSCOPY N/A 2/20/2024    Procedure: COLONOSCOPY;  Surgeon: Breann Harrell MD;  Location: [facility] ENDO;  Service: Endoscopy;  Laterality: N/A;    HERNIA REPAIR      kidney stones     No current facility-administered medications for this encounter.    Current Outpatient Medications:     azelastine (OPTIVAR) 0.05 % ophthalmic solution, Place 1 drop into both eyes 2 (two) times daily., Disp: , Rfl:     ALPRAZolam (XANAX) 2 MG Tab, Take 1 tablet (2 mg total) by mouth 2 (two) times daily as needed (anxiety)., Disp: 60 tablet, Rfl: 0    amlodipine-olmesartan (ZACKARY) 5-20 mg per tablet, Take 1 tablet by mouth once daily., Disp: 90 tablet, Rfl: 2    fezolinetant (VEOZAH) 45 mg Tab, Take 45 mg by mouth once daily., Disp: 90 tablet, Rfl: 0    fluconazole (DIFLUCAN) 200 MG Tab, 1 po bi-weekly x 2 weeks, Disp: 24 tablet, Rfl: 1    levocetirizine (XYZAL) 5 MG tablet, Take 1 tablet (5 mg total) by mouth every evening., Disp: 90 tablet, Rfl: 2    montelukast (SINGULAIR) 10 mg tablet, Take 1 tablet (10 mg total) by mouth once daily., Disp: 90 tablet, Rfl: 2    rosuvastatin (CRESTOR) 10 MG tablet, Take 1 tablet (10 mg total) by mouth once daily., Disp: 90 tablet, Rfl: 2         Pre-op Assessment    I have reviewed the Patient Summary Reports.    I have reviewed the NPO Status.   I have reviewed the Medications.     Review of Systems  Anesthesia Hx:  No problems with previous Anesthesia   Neg history of prior surgery.           Denies Family Hx of Anesthesia complications.    Denies Personal Hx of Anesthesia complications.                    Social:  Non-Smoker, Social Alcohol Use       Cardiovascular:     Hypertension                                        Renal/:  Chronic Renal Disease                Hepatic/GI:     GERD             Psych:  Psychiatric History anxiety                 Physical Exam  General: Alert and Oriented    Airway:  Mallampati: I / I  Mouth Opening: Normal  TM Distance: Normal  Tongue: Normal  Neck ROM: Normal ROM    Dental:  Intact    Chest/Lungs:  Clear to auscultation, Normal Respiratory Rate    Heart:  Rate: Normal  Rhythm: Regular Rhythm        Anesthesia Plan  Type of Anesthesia, risks & benefits discussed:    Anesthesia Type: Gen Natural Airway  Intra-op Monitoring Plan: Standard ASA Monitors  Post Op Pain Control Plan: multimodal analgesia  Induction:  IV  Informed Consent: Informed consent signed with the Patient and all parties understand the risks and agree with anesthesia plan.  All questions answered.   ASA Score: 2  Day of Surgery Review of History & Physical: H&P Update referred to the surgeon/provider.    Ready For Surgery From Anesthesia Perspective.     .

## 2024-07-24 ENCOUNTER — ANESTHESIA (OUTPATIENT)
Dept: ENDOSCOPY | Facility: HOSPITAL | Age: 49
End: 2024-07-24
Payer: COMMERCIAL

## 2024-07-24 ENCOUNTER — HOSPITAL ENCOUNTER (OUTPATIENT)
Facility: HOSPITAL | Age: 49
Discharge: HOME | End: 2024-07-24
Attending: INTERNAL MEDICINE | Admitting: INTERNAL MEDICINE
Payer: COMMERCIAL

## 2024-07-24 VITALS
HEIGHT: 69 IN | OXYGEN SATURATION: 99 % | RESPIRATION RATE: 16 BRPM | TEMPERATURE: 98 F | BODY MASS INDEX: 26.77 KG/M2 | WEIGHT: 180.75 LBS | HEART RATE: 79 BPM | DIASTOLIC BLOOD PRESSURE: 86 MMHG | SYSTOLIC BLOOD PRESSURE: 138 MMHG

## 2024-07-24 DIAGNOSIS — Z86.010 PERSONAL HISTORY OF COLONIC POLYPS: Primary | ICD-10-CM

## 2024-07-24 PROBLEM — Z86.0100 PERSONAL HISTORY OF COLONIC POLYPS: Status: ACTIVE | Noted: 2024-07-24

## 2024-07-24 LAB
B-HCG UR QL: NEGATIVE
CTP QC/QA: YES

## 2024-07-24 PROCEDURE — 37000009 HC ANESTHESIA EA ADD 15 MINS: Performed by: INTERNAL MEDICINE

## 2024-07-24 PROCEDURE — 45380 COLONOSCOPY AND BIOPSY: CPT | Mod: 33,,, | Performed by: INTERNAL MEDICINE

## 2024-07-24 PROCEDURE — 25000003 PHARM REV CODE 250: Performed by: INTERNAL MEDICINE

## 2024-07-24 PROCEDURE — 88305 TISSUE EXAM BY PATHOLOGIST: CPT | Performed by: PATHOLOGY

## 2024-07-24 PROCEDURE — 63600175 PHARM REV CODE 636 W HCPCS: Performed by: INTERNAL MEDICINE

## 2024-07-24 PROCEDURE — 37000008 HC ANESTHESIA 1ST 15 MINUTES: Performed by: INTERNAL MEDICINE

## 2024-07-24 PROCEDURE — 25000003 PHARM REV CODE 250: Performed by: NURSE ANESTHETIST, CERTIFIED REGISTERED

## 2024-07-24 PROCEDURE — 63600175 PHARM REV CODE 636 W HCPCS: Performed by: NURSE ANESTHETIST, CERTIFIED REGISTERED

## 2024-07-24 PROCEDURE — 27201012 HC FORCEPS, HOT/COLD, DISP: Performed by: INTERNAL MEDICINE

## 2024-07-24 PROCEDURE — 81025 URINE PREGNANCY TEST: CPT | Performed by: INTERNAL MEDICINE

## 2024-07-24 PROCEDURE — 45380 COLONOSCOPY AND BIOPSY: CPT | Mod: 33 | Performed by: INTERNAL MEDICINE

## 2024-07-24 RX ORDER — PROPOFOL 10 MG/ML
VIAL (ML) INTRAVENOUS
Status: DISCONTINUED | OUTPATIENT
Start: 2024-07-24 | End: 2024-07-24

## 2024-07-24 RX ORDER — LIDOCAINE HYDROCHLORIDE 20 MG/ML
INJECTION, SOLUTION EPIDURAL; INFILTRATION; INTRACAUDAL; PERINEURAL
Status: DISCONTINUED | OUTPATIENT
Start: 2024-07-24 | End: 2024-07-24

## 2024-07-24 RX ORDER — SODIUM CHLORIDE, SODIUM LACTATE, POTASSIUM CHLORIDE, CALCIUM CHLORIDE 600; 310; 30; 20 MG/100ML; MG/100ML; MG/100ML; MG/100ML
INJECTION, SOLUTION INTRAVENOUS CONTINUOUS
Status: DISCONTINUED | OUTPATIENT
Start: 2024-07-24 | End: 2024-07-24 | Stop reason: HOSPADM

## 2024-07-24 RX ORDER — DEXTROMETHORPHAN/PSEUDOEPHED 2.5-7.5/.8
DROPS ORAL
Status: DISCONTINUED | OUTPATIENT
Start: 2024-07-24 | End: 2024-07-24 | Stop reason: HOSPADM

## 2024-07-24 RX ADMIN — PROPOFOL 50 MG: 10 INJECTION, EMULSION INTRAVENOUS at 09:07

## 2024-07-24 RX ADMIN — SODIUM CHLORIDE, SODIUM LACTATE, POTASSIUM CHLORIDE, AND CALCIUM CHLORIDE: 600; 310; 30; 20 INJECTION, SOLUTION INTRAVENOUS at 09:07

## 2024-07-24 RX ADMIN — LIDOCAINE HYDROCHLORIDE 50 MG: 20 INJECTION, SOLUTION EPIDURAL; INFILTRATION; INTRACAUDAL; PERINEURAL at 09:07

## 2024-07-24 RX ADMIN — PROPOFOL 100 MG: 10 INJECTION, EMULSION INTRAVENOUS at 09:07

## 2024-07-24 NOTE — H&P
Short Stay Endoscopy History and Physical    PCP - Shobha Mendes MD    Procedure - Colonoscopy  ASA - per anesthesia  Mallampati - per anesthesia  History of Anesthesia problems - no  Family history Anesthesia problems -  no     HPI:  This is a 48 y.o. female here for evaluation of :   Active Hospital Problems    Diagnosis  POA    *Personal history of colonic polyps [Z86.010]  Not Applicable      Resolved Hospital Problems   No resolved problems to display.         Health Maintenance         Date Due Completion Date    Pneumococcal Vaccines (Age 0-64) (1 of 2 - PCV) Never done ---    TETANUS VACCINE Never done ---    COVID-19 Vaccine (2 - 2023-24 season) 09/01/2023 3/13/2021    Influenza Vaccine (1) 09/01/2024 ---    Hemoglobin A1c (Prediabetes) 01/29/2025 1/29/2024    Mammogram 04/01/2025 4/1/2024    Colorectal Cancer Screening 02/20/2027 2/20/2024    Lipid Panel 01/29/2029 1/29/2024    Cervical Cancer Screening 04/01/2029 4/1/2024              ROS:  CONSTITUTIONAL: Denies weight change,  fatigue, fevers, chills, night sweats.  CARDIOVASCULAR: Denies chest pain, shortness of breath, orthopnea and edema.  RESPIRATORY: Denies cough, hemoptysis, dyspnea, and wheezing.  GI: See HPI.    Medical History:   Past Medical History:   Diagnosis Date    Anxiety disorder, unspecified     Hypertension     Kidney stones 01/23/2023       Surgical History:   Past Surgical History:   Procedure Laterality Date    BREAST BIOPSY Left 2018    COLONOSCOPY N/A 2/19/2024    Procedure: COLONOSCOPY;  Surgeon: Julissa Dupree MD;  Location: [facility];  Service: Endoscopy;  Laterality: N/A;    COLONOSCOPY N/A 2/20/2024    Procedure: COLONOSCOPY;  Surgeon: Breann Harrell MD;  Location: [facility];  Service: Endoscopy;  Laterality: N/A;    HERNIA REPAIR      kidney stones         Family History:   Family History   Problem Relation Name Age of Onset    Diabetes Mother      Diabetes Maternal Grandmother      Diabetes  Maternal Grandfather         Social History:   Social History     Tobacco Use    Smoking status: Some Days     Types: Cigarettes     Passive exposure: Current    Smokeless tobacco: Never   Substance Use Topics    Alcohol use: Yes     Alcohol/week: 3.0 standard drinks of alcohol     Types: 3 Glasses of wine per week     Comment: weeekly    Drug use: No       Allergies:   Review of patient's allergies indicates:   Allergen Reactions    Ace inhibitors Anaphylaxis, Shortness Of Breath and Swelling     Other reaction(s): Reaction:Facial swelling; Alternate Reaction: Anaphylaxis;       Medications:   No current facility-administered medications on file prior to encounter.     Current Outpatient Medications on File Prior to Encounter   Medication Sig Dispense Refill    amlodipine-olmesartan (ZACKARY) 5-20 mg per tablet Take 1 tablet by mouth once daily. 90 tablet 2    azelastine (OPTIVAR) 0.05 % ophthalmic solution Place 1 drop into both eyes 2 (two) times daily.      rosuvastatin (CRESTOR) 10 MG tablet Take 1 tablet (10 mg total) by mouth once daily. 90 tablet 2    levocetirizine (XYZAL) 5 MG tablet Take 1 tablet (5 mg total) by mouth every evening. 90 tablet 2    montelukast (SINGULAIR) 10 mg tablet Take 1 tablet (10 mg total) by mouth once daily. 90 tablet 2       Physical Exam:  Vital Signs:   Vitals:    07/24/24 0835   BP: (!) 160/100   Pulse: 98   Resp: 18   Temp: 96.8 °F (36 °C)     General Appearance: Well appearing in no acute distress  ENT: OP clear  Chest: CTA B  CV: RRR, no m/r/g  Abd: s/nt/nd/nabs  Ext: no edema    Labs:Reviewed    IMP:  Active Hospital Problems    Diagnosis  POA    *Personal history of colonic polyps [Z86.010]  Not Applicable      Resolved Hospital Problems   No resolved problems to display.         Plan:   I have explained the risks and benefits of colonoscopy to the patient including but not limited to bleeding, perforation, infection, and death. The patient wishes to proceed.

## 2024-07-24 NOTE — ANESTHESIA POSTPROCEDURE EVALUATION
Anesthesia Post Evaluation    Patient: Bessy Egan    Procedure(s) Performed: Procedure(s) (LRB):  COLONOSCOPY (N/A)    Final Anesthesia Type: general      Patient location during evaluation: PACU  Patient participation: Yes- Able to Participate  Level of consciousness: awake and alert and oriented  Post-procedure vital signs: reviewed and stable  Pain management: adequate  Airway patency: patent    PONV status at discharge: No PONV  Anesthetic complications: no      Cardiovascular status: blood pressure returned to baseline, stable and hemodynamically stable  Respiratory status: unassisted  Hydration status: euvolemic  Follow-up not needed.              Vitals Value Taken Time   /82 07/24/24 1003   Temp 36.5 °C (97.7 °F) 07/24/24 0953   Pulse 91 07/24/24 1003   Resp 16 07/24/24 1003   SpO2 98 % 07/24/24 1003         No case tracking events are documented in the log.      Pain/Jonathan Score: Jonathan Score: 9 (7/24/2024  9:53 AM)

## 2024-07-24 NOTE — TRANSFER OF CARE
"Anesthesia Transfer of Care Note    Patient: Bessy Egan    Procedure(s) Performed: Procedure(s) (LRB):  COLONOSCOPY (N/A)    Patient location: PACU    Anesthesia Type: general    Transport from OR: Transported from OR on room air with adequate spontaneous ventilation    Post pain: adequate analgesia    Post assessment: no apparent anesthetic complications and tolerated procedure well    Post vital signs: stable    Level of consciousness: awake    Nausea/Vomiting: no nausea/vomiting    Complications: none    Transfer of care protocol was followed      Last vitals: Visit Vitals  BP (!) 160/100   Pulse 98   Temp 36 °C (96.8 °F)   Resp 18   Ht 5' 9" (1.753 m)   Wt 82 kg (180 lb 12.4 oz)   SpO2 100%   Breastfeeding No   BMI 26.70 kg/m²     "

## 2024-07-24 NOTE — PROVATION PATIENT INSTRUCTIONS
Discharge Summary/Instructions after an Endoscopic Procedure  Patient Name: Bessy Egan  Patient MRN: [id number]  Patient YOB: 1975 Wednesday, July 24, 2024  Julissa Dupree MD  Dear patient,  As a result of recent federal legislation (The Federal Cures Act), you may   receive lab or pathology results from your procedure in your [facility]   account before your physician is able to contact you. Your physician or   their representative will relay the results to you with their   recommendations at their soonest availability.  Thank you,  RESTRICTIONS:  During your procedure today, you received medications for sedation.  These   medications may affect your judgment, balance and coordination.  Therefore,   for 24 hours, you have the following restrictions:   - DO NOT drive a car, operate machinery, make legal/financial decisions,   sign important papers or drink alcohol.    ACTIVITY:  Today: no heavy lifting, straining or running due to procedural   sedation/anesthesia.  The following day: return to full activity including work.  DIET:  Eat and drink normally unless instructed otherwise.     TREATMENT FOR COMMON SIDE EFFECTS:  - Mild abdominal pain, nausea, belching, bloating or excessive gas:  rest,   eat lightly and use a heating pad.  - Sore Throat: treat with throat lozenges and/or gargle with warm salt   water.  - Because air was used during the procedure, expelling large amounts of air   from your rectum or belching is normal.  - If a bowel prep was taken, you may not have a bowel movement for 1-3 days.    This is normal.  SYMPTOMS TO WATCH FOR AND REPORT TO YOUR PHYSICIAN:  1. Abdominal pain or bloating, other than gas cramps.  2. Chest pain.  3. Back pain.  4. Signs of infection such as: chills or fever occurring within 24 hours   after the procedure.  5. Rectal bleeding, which would show as bright red, maroon, or black stools.   (A tablespoon of blood from the rectum is not serious, especially  if   hemorrhoids are present.)  6. Vomiting.  7. Weakness or dizziness.  GO DIRECTLY TO THE NEAREST EMERGENCY ROOM IF YOU HAVE ANY OF THE FOLLOWING:      Difficulty breathing              Chills and/or fever over 101 F   Persistent vomiting and/or vomiting blood   Severe abdominal pain   Severe chest pain   Black, tarry stools   Bleeding- more than one tablespoon   Any other symptom or condition that you feel may need urgent attention  Your doctor recommends these additional instructions:  If any biopsies were taken, your doctors clinic will contact you in 1 to 2   weeks with any results.  - Discharge patient to home (via wheelchair).   - Resume previous diet.   - Continue present medications.   - Await pathology results.   - Repeat colonoscopy in 3 years for surveillance.   - Patient has a contact number available for emergencies.  The signs and   symptoms of potential delayed complications were discussed with the   patient.  Return to normal activities tomorrow.  Written discharge   instructions were provided to the patient.  For questions, problems or results please call your physician Julissa Dupree MD at Work:  [phone]  If you have any questions about the above instructions, call the GI   department at [phone] or call the endoscopy unit at [phone]   from 7am until 3 pm.  [facility] [place], EMERGENCY ROOM PHONE NUMBER:   [phone]  IF A COMPLICATION OR EMERGENCY SITUATION ARISES AND YOU ARE UNABLE TO REACH   YOUR PHYSICIAN - GO DIRECTLY TO THE EMERGENCY ROOM.  I have read or have had read to me these discharge instructions for my   procedure and have received a written copy.  I understand these   instructions and will follow-up with my physician if I have any questions.     __________________________________       _____________________________________  Nurse Signature                                          Patient/Designated   Responsible Party  Signature  MD Julissa Nolan MD  7/24/2024 9:52:44 AM  PROVATION

## 2024-07-24 NOTE — DISCHARGE SUMMARY
[facility] [place] [facility] 1st Fl  Discharge Note  Short Stay    Procedure(s) (LRB):  COLONOSCOPY (N/A)      OUTCOME: Patient tolerated treatment/procedure well without complication and is now ready for discharge.    DISPOSITION: Home or Self Care    FINAL DIAGNOSIS:  Personal history of colonic polyps    FOLLOWUP: With primary care provider    DISCHARGE INSTRUCTIONS:  No discharge procedures on file.

## 2024-07-24 NOTE — PLAN OF CARE
Discharge instructions reviewed with pt and family, handouts given, verbalized understanding with no further questions at this time.  spoke to pt at bedside, reviewed procedure and answered questions aware they are awaiting biopsy results with MD telephone number provided per AVS sheet. VSS on RA, no pain or nausea noted, tolerating po fluids without difficulty. Fall precautions reviewed, consents in chart.

## 2024-07-26 DIAGNOSIS — L81.9 DYSCHROMIA: Chronic | ICD-10-CM

## 2024-07-26 DIAGNOSIS — B36.0 TINEA VERSICOLOR: Chronic | ICD-10-CM

## 2024-07-26 DIAGNOSIS — F41.1 GENERALIZED ANXIETY DISORDER: Chronic | ICD-10-CM

## 2024-07-26 RX ORDER — ALPRAZOLAM 2 MG/1
2 TABLET ORAL 2 TIMES DAILY PRN
Qty: 60 TABLET | Refills: 0 | Status: SHIPPED | OUTPATIENT
Start: 2024-07-26

## 2024-07-26 RX ORDER — FLUCONAZOLE 200 MG/1
TABLET ORAL
Qty: 24 TABLET | Refills: 1 | Status: SHIPPED | OUTPATIENT
Start: 2024-07-26

## 2024-07-26 NOTE — TELEPHONE ENCOUNTER
No care due was identified.  [facility] Embedded Care Due Messages. Reference number: [id number].   7/26/2024 3:51:28 PM CDT

## 2024-07-30 LAB
FINAL PATHOLOGIC DIAGNOSIS: NORMAL
GROSS: NORMAL
Lab: NORMAL

## 2024-08-16 ENCOUNTER — TELEPHONE (OUTPATIENT)
Dept: PHARMACY | Facility: CLINIC | Age: 49
End: 2024-08-16
Payer: COMMERCIAL

## 2024-08-16 NOTE — TELEPHONE ENCOUNTER
[facility]/Population Health Chart Review & Patient Outreach Details For Medication Adherence Project    Reason for Outreach Encounter: 3rd Party payor non-compliance report ([employer], BCBS, [employer], etc)    2.  Patient Outreach Method: Reviewed patient chart  and [employer] online [employer] message    3.   Medication in question:   Hypertension Medications               amlodipine-olmesartan (ZACKARY) 5-20 mg per tablet Take 1 tablet by mouth once daily.              Hyperlipidemia Medications               rosuvastatin (CRESTOR) 10 MG tablet Take 1 tablet (10 mg total) by mouth once daily.                LAST FILLED: 6/11/24 for 90 day supply on zackary; rosuvastatin LF 90 ds 1/29/24    4.  Reviewed and or Updates Made To: Patient Chart    5. Outreach Outcomes and/or actions taken: Patient filled medication and is on track to be adherent and Sent inquiry to patient: Waiting for response    Additional Notes:

## 2024-08-19 DIAGNOSIS — E78.5 HYPERLIPIDEMIA, UNSPECIFIED HYPERLIPIDEMIA TYPE: Chronic | ICD-10-CM

## 2024-08-19 RX ORDER — ROSUVASTATIN CALCIUM 10 MG/1
10 TABLET, COATED ORAL DAILY
Qty: 90 TABLET | Refills: 1 | Status: SHIPPED | OUTPATIENT
Start: 2024-08-19

## 2024-08-19 NOTE — TELEPHONE ENCOUNTER
Bessy Egan  is requesting a refill authorization.  Brief Assessment and Rationale for Refill:  Approve     Medication Therapy Plan:         Comments:     Note composed:10:23 AM 08/19/2024

## 2024-08-19 NOTE — TELEPHONE ENCOUNTER
No care due was identified.  [facility] Embedded Care Due Messages. Reference number: [id number].   8/19/2024 10:22:15 AM CDT

## 2024-10-17 ENCOUNTER — OFFICE VISIT (OUTPATIENT)
Dept: INTERNAL MEDICINE | Facility: CLINIC | Age: 49
End: 2024-10-17
Payer: COMMERCIAL

## 2024-10-17 ENCOUNTER — LAB VISIT (OUTPATIENT)
Dept: LAB | Facility: HOSPITAL | Age: 49
End: 2024-10-17
Attending: FAMILY MEDICINE
Payer: COMMERCIAL

## 2024-10-17 VITALS
OXYGEN SATURATION: 98 % | BODY MASS INDEX: 25.73 KG/M2 | SYSTOLIC BLOOD PRESSURE: 128 MMHG | HEART RATE: 108 BPM | WEIGHT: 173.75 LBS | HEIGHT: 69 IN | DIASTOLIC BLOOD PRESSURE: 76 MMHG | TEMPERATURE: 99 F

## 2024-10-17 DIAGNOSIS — E78.5 HYPERLIPIDEMIA, UNSPECIFIED HYPERLIPIDEMIA TYPE: Chronic | ICD-10-CM

## 2024-10-17 DIAGNOSIS — K21.9 GASTROESOPHAGEAL REFLUX DISEASE WITHOUT ESOPHAGITIS: Chronic | ICD-10-CM

## 2024-10-17 DIAGNOSIS — J30.9 ALLERGIC RHINITIS, UNSPECIFIED SEASONALITY, UNSPECIFIED TRIGGER: Chronic | ICD-10-CM

## 2024-10-17 DIAGNOSIS — L81.9 DYSCHROMIA: Chronic | ICD-10-CM

## 2024-10-17 DIAGNOSIS — I10 PRIMARY HYPERTENSION: Primary | Chronic | ICD-10-CM

## 2024-10-17 DIAGNOSIS — R73.03 PREDIABETES: ICD-10-CM

## 2024-10-17 DIAGNOSIS — B36.0 TINEA VERSICOLOR: Chronic | ICD-10-CM

## 2024-10-17 DIAGNOSIS — N95.1 HOT FLASHES DUE TO MENOPAUSE: Chronic | ICD-10-CM

## 2024-10-17 DIAGNOSIS — I10 PRIMARY HYPERTENSION: ICD-10-CM

## 2024-10-17 DIAGNOSIS — E78.5 HYPERLIPIDEMIA, UNSPECIFIED HYPERLIPIDEMIA TYPE: ICD-10-CM

## 2024-10-17 DIAGNOSIS — F41.1 GENERALIZED ANXIETY DISORDER: Chronic | ICD-10-CM

## 2024-10-17 PROCEDURE — 99214 OFFICE O/P EST MOD 30 MIN: CPT | Mod: S$GLB,,, | Performed by: FAMILY MEDICINE

## 2024-10-17 PROCEDURE — 99999 PR PBB SHADOW E&M-EST. PATIENT-LVL III: CPT | Mod: PBBFAC,,, | Performed by: FAMILY MEDICINE

## 2024-10-17 PROCEDURE — 4010F ACE/ARB THERAPY RXD/TAKEN: CPT | Mod: CPTII,S$GLB,, | Performed by: FAMILY MEDICINE

## 2024-10-17 PROCEDURE — 3078F DIAST BP <80 MM HG: CPT | Mod: CPTII,S$GLB,, | Performed by: FAMILY MEDICINE

## 2024-10-17 PROCEDURE — 1159F MED LIST DOCD IN RCRD: CPT | Mod: CPTII,S$GLB,, | Performed by: FAMILY MEDICINE

## 2024-10-17 PROCEDURE — 3008F BODY MASS INDEX DOCD: CPT | Mod: CPTII,S$GLB,, | Performed by: FAMILY MEDICINE

## 2024-10-17 PROCEDURE — 3044F HG A1C LEVEL LT 7.0%: CPT | Mod: CPTII,S$GLB,, | Performed by: FAMILY MEDICINE

## 2024-10-17 PROCEDURE — 83036 HEMOGLOBIN GLYCOSYLATED A1C: CPT | Performed by: FAMILY MEDICINE

## 2024-10-17 PROCEDURE — 1160F RVW MEDS BY RX/DR IN RCRD: CPT | Mod: CPTII,S$GLB,, | Performed by: FAMILY MEDICINE

## 2024-10-17 PROCEDURE — 36415 COLL VENOUS BLD VENIPUNCTURE: CPT | Mod: PO | Performed by: FAMILY MEDICINE

## 2024-10-17 PROCEDURE — 3074F SYST BP LT 130 MM HG: CPT | Mod: CPTII,S$GLB,, | Performed by: FAMILY MEDICINE

## 2024-10-17 RX ORDER — AMLODIPINE AND OLMESARTAN MEDOXOMIL 5; 20 MG/1; MG/1
1 TABLET ORAL DAILY
Qty: 90 TABLET | Refills: 3 | Status: SHIPPED | OUTPATIENT
Start: 2024-10-17

## 2024-10-17 RX ORDER — AZELASTINE HYDROCHLORIDE 0.5 MG/ML
1 SOLUTION/ DROPS OPHTHALMIC 2 TIMES DAILY
Qty: 12 ML | Refills: 6 | Status: SHIPPED | OUTPATIENT
Start: 2024-10-17

## 2024-10-17 RX ORDER — CLONIDINE HYDROCHLORIDE 0.1 MG/1
0.1 TABLET ORAL NIGHTLY
Qty: 30 TABLET | Refills: 0 | Status: SHIPPED | OUTPATIENT
Start: 2024-10-17

## 2024-10-17 RX ORDER — ALPRAZOLAM 2 MG/1
2 TABLET ORAL 2 TIMES DAILY PRN
Qty: 60 TABLET | Refills: 0 | Status: SHIPPED | OUTPATIENT
Start: 2024-10-17

## 2024-10-17 RX ORDER — ESOMEPRAZOLE MAGNESIUM 40 MG/1
40 CAPSULE, DELAYED RELEASE ORAL
Qty: 30 CAPSULE | Refills: 6 | Status: SHIPPED | OUTPATIENT
Start: 2024-10-17

## 2024-10-17 RX ORDER — LEVOCETIRIZINE DIHYDROCHLORIDE 5 MG/1
5 TABLET, FILM COATED ORAL NIGHTLY
Qty: 90 TABLET | Refills: 3 | Status: SHIPPED | OUTPATIENT
Start: 2024-10-17

## 2024-10-17 RX ORDER — FLUCONAZOLE 200 MG/1
TABLET ORAL
Qty: 24 TABLET | Refills: 2 | Status: SHIPPED | OUTPATIENT
Start: 2024-10-17

## 2024-10-17 RX ORDER — MONTELUKAST SODIUM 10 MG/1
10 TABLET ORAL DAILY
Qty: 90 TABLET | Refills: 3 | Status: SHIPPED | OUTPATIENT
Start: 2024-10-17

## 2024-10-17 RX ORDER — ROSUVASTATIN CALCIUM 10 MG/1
10 TABLET, COATED ORAL DAILY
Qty: 90 TABLET | Refills: 3 | Status: SHIPPED | OUTPATIENT
Start: 2024-10-17

## 2024-10-18 LAB
ESTIMATED AVG GLUCOSE: 117 MG/DL (ref 68–131)
HBA1C MFR BLD: 5.7 % (ref 4–5.6)

## 2024-10-31 PROBLEM — J30.9 ALLERGIC RHINITIS: Chronic | Status: ACTIVE | Noted: 2024-10-31

## 2024-10-31 PROBLEM — N95.1 HOT FLASHES DUE TO MENOPAUSE: Status: ACTIVE | Noted: 2024-10-31

## 2024-10-31 PROBLEM — B36.0 TINEA VERSICOLOR: Chronic | Status: ACTIVE | Noted: 2024-10-31

## 2024-10-31 PROBLEM — F41.1 GENERALIZED ANXIETY DISORDER: Chronic | Status: ACTIVE | Noted: 2024-10-31

## 2025-03-16 ENCOUNTER — TELEPHONE (OUTPATIENT)
Dept: PHARMACY | Facility: CLINIC | Age: 50
End: 2025-03-16
Payer: COMMERCIAL

## 2025-03-17 NOTE — TELEPHONE ENCOUNTER
[facility]/Population Health Chart Review & Patient Outreach Details For Medication Adherence Project    Reason for Outreach Encounter: 3rd Party payor non-compliance report ([employer], BCBS, [facility], etc)  2.  Patient Outreach Method: [employer] message  3.   Medication in question: zackary   LAST FILLED: 10/17/24 for 90 day supply  Hypertension Medications              amlodipine-olmesartan (ZACKARY) 5-20 mg per tablet Take 1 tablet by mouth once daily.    cloNIDine (CATAPRES) 0.1 MG tablet Take 1 tablet (0.1 mg total) by mouth nightly.              4.  Reviewed and or Updates Made To: Patient Chart  5. Outreach Outcomes and/or actions taken: Sent inquiry to patient: Waiting for response.

## 2025-03-18 ENCOUNTER — TELEPHONE (OUTPATIENT)
Dept: INTERNAL MEDICINE | Facility: CLINIC | Age: 50
End: 2025-03-18
Payer: COMMERCIAL

## 2025-04-22 ENCOUNTER — TELEPHONE (OUTPATIENT)
Dept: PHARMACY | Facility: CLINIC | Age: 50
End: 2025-04-22
Payer: COMMERCIAL

## 2025-04-22 NOTE — TELEPHONE ENCOUNTER
[facility]/Population Health Chart Review & Patient Outreach Details For Medication Adherence Project    Reason for Outreach Encounter: 3rd Party payor non-compliance report ([employer], BCBS, [facility], etc)  2.  Patient Outreach Method: [employer] message  3.   Medication in question: zackary   LAST FILLED: 10/17/24 for 90 day supply  Hypertension Medications              amlodipine-olmesartan (ZACKARY) 5-20 mg per tablet Take 1 tablet by mouth once daily.    cloNIDine (CATAPRES) 0.1 MG tablet Take 1 tablet (0.1 mg total) by mouth nightly.              4.  Reviewed and or Updates Made To: Patient Chart  5. Outreach Outcomes and/or actions taken: Sent inquiry to patient: Waiting for response.

## 2025-05-17 ENCOUNTER — TELEPHONE (OUTPATIENT)
Dept: PHARMACY | Facility: CLINIC | Age: 50
End: 2025-05-17
Payer: COMMERCIAL

## 2025-05-18 NOTE — TELEPHONE ENCOUNTER
[facility]Population Health Chart Review & Patient Outreach Details For Medication Adherence Project    Reason for Outreach Encounter: 3rd Party payor non-compliance report ([employer], BCBS, [facility], etc)  2.  Patient Outreach Method: [employer] message  3.   Medication in question: zackary   LAST FILLED: 10/17/24 for 90 day supply  Hypertension Medications              amlodipine-olmesartan (ZACKARY) 5-20 mg per tablet Take 1 tablet by mouth once daily.    cloNIDine (CATAPRES) 0.1 MG tablet Take 1 tablet (0.1 mg total) by mouth nightly.              4.  Reviewed and or Updates Made To: Patient Chart  5. Outreach Outcomes and/or actions taken: Sent inquiry to patient: Waiting for response.

## 2025-05-26 DIAGNOSIS — F41.1 GENERALIZED ANXIETY DISORDER: Chronic | ICD-10-CM

## 2025-05-26 DIAGNOSIS — E78.5 HYPERLIPIDEMIA, UNSPECIFIED HYPERLIPIDEMIA TYPE: Chronic | ICD-10-CM

## 2025-05-26 DIAGNOSIS — I10 PRIMARY HYPERTENSION: Chronic | ICD-10-CM

## 2025-05-26 RX ORDER — AMLODIPINE BESYLATE AND OLMESARTAN MEDOXOMIL 5; 20 MG/1; MG/1
1 TABLET ORAL DAILY
Qty: 90 TABLET | Refills: 1 | Status: SHIPPED | OUTPATIENT
Start: 2025-05-26

## 2025-05-27 RX ORDER — ALPRAZOLAM 2 MG/1
2 TABLET ORAL 2 TIMES DAILY PRN
Qty: 60 TABLET | Refills: 0 | Status: SHIPPED | OUTPATIENT
Start: 2025-05-27

## 2025-05-27 RX ORDER — ROSUVASTATIN CALCIUM 10 MG/1
10 TABLET, COATED ORAL DAILY
Qty: 90 TABLET | Refills: 3 | Status: SHIPPED | OUTPATIENT
Start: 2025-05-27

## 2025-05-27 NOTE — TELEPHONE ENCOUNTER
Care Due:                  Date            Visit Type   Department     Provider  --------------------------------------------------------------------------------                                MYCHART                              FOLLOWUP/OF  [facility] INTERNAL  Shobha  Last Visit: 10-      FICE VISIT   MEDICINE       Cinthya                              MYCHART                              ANNUAL                              CHECKUP/PHY  [facility] INTERNAL  Shobha  Next Visit: 06-      S            PATTI Mendes                                                            Last  Test          Frequency    Reason                     Performed    Due Date  --------------------------------------------------------------------------------    Lipid Panel.  12 months..  rosuvastatin.............  01- 01-    [facility] Embedded Care Due Messages. Reference number: [id number].   5/26/2025 7:58:26 PM CDT

## 2025-05-27 NOTE — TELEPHONE ENCOUNTER
Refill Routing Note   Medication(s) are not appropriate for processing by [facility] for the following reason(s):        Required labs outdated  Outside of protocol    [facility] action(s):  Defer  Route  Approve   Requires labs : Yes             Appointments  past 12m or future 3m with PCP    Date Provider   Last Visit   10/17/2024 Shobha Mendes MD   Next Visit   6/26/2025 Shobha Mendes MD   ED visits in past 90 days: 0        Note composed:8:31 PM 05/26/2025

## 2025-06-16 ENCOUNTER — TELEPHONE (OUTPATIENT)
Dept: PHARMACY | Facility: CLINIC | Age: 50
End: 2025-06-16
Payer: COMMERCIAL

## 2025-06-17 NOTE — TELEPHONE ENCOUNTER
[facility]Population Health Chart Review & Patient Outreach Details For Medication Adherence Project    Reason for Outreach Encounter: 3rd Party payor non-compliance report ([employer], BCBS, C, etc)  2.  Patient Outreach Method: Reviewed Patient Chart  3.   Medication in question: zackary    LAST FILLED: 5/27/25 for 90 day supply  Hypertension Medications              amlodipine-olmesartan (ZACKARY) 5-20 mg per tablet Take 1 tablet by mouth once daily.    cloNIDine (CATAPRES) 0.1 MG tablet Take 1 tablet (0.1 mg total) by mouth nightly.              4.  Reviewed and or Updates Made To: Patient Chart  5. Outreach Outcomes and/or actions taken: Patient filled medication and is on track to be adherent

## 2025-06-26 ENCOUNTER — OFFICE VISIT (OUTPATIENT)
Dept: INTERNAL MEDICINE | Facility: CLINIC | Age: 50
End: 2025-06-26
Payer: COMMERCIAL

## 2025-06-26 VITALS
DIASTOLIC BLOOD PRESSURE: 80 MMHG | TEMPERATURE: 98 F | SYSTOLIC BLOOD PRESSURE: 126 MMHG | HEART RATE: 110 BPM | BODY MASS INDEX: 27.07 KG/M2 | OXYGEN SATURATION: 96 % | HEIGHT: 69 IN | WEIGHT: 182.75 LBS

## 2025-06-26 DIAGNOSIS — E78.2 MIXED HYPERLIPIDEMIA: Chronic | ICD-10-CM

## 2025-06-26 DIAGNOSIS — R25.2 MUSCLE CRAMPS: ICD-10-CM

## 2025-06-26 DIAGNOSIS — I10 PRIMARY HYPERTENSION: Chronic | ICD-10-CM

## 2025-06-26 DIAGNOSIS — K21.9 GASTROESOPHAGEAL REFLUX DISEASE WITHOUT ESOPHAGITIS: Chronic | ICD-10-CM

## 2025-06-26 DIAGNOSIS — Z00.00 ROUTINE GENERAL MEDICAL EXAMINATION AT A HEALTH CARE FACILITY: Primary | ICD-10-CM

## 2025-06-26 DIAGNOSIS — F41.1 GENERALIZED ANXIETY DISORDER: Chronic | ICD-10-CM

## 2025-06-26 PROCEDURE — 4010F ACE/ARB THERAPY RXD/TAKEN: CPT | Mod: CPTII,S$GLB,, | Performed by: FAMILY MEDICINE

## 2025-06-26 PROCEDURE — 99999 PR PBB SHADOW E&M-EST. PATIENT-LVL IV: CPT | Mod: PBBFAC,,, | Performed by: FAMILY MEDICINE

## 2025-06-26 PROCEDURE — 3079F DIAST BP 80-89 MM HG: CPT | Mod: CPTII,S$GLB,, | Performed by: FAMILY MEDICINE

## 2025-06-26 PROCEDURE — 99396 PREV VISIT EST AGE 40-64: CPT | Mod: S$GLB,,, | Performed by: FAMILY MEDICINE

## 2025-06-26 PROCEDURE — 1159F MED LIST DOCD IN RCRD: CPT | Mod: CPTII,S$GLB,, | Performed by: FAMILY MEDICINE

## 2025-06-26 PROCEDURE — 3074F SYST BP LT 130 MM HG: CPT | Mod: CPTII,S$GLB,, | Performed by: FAMILY MEDICINE

## 2025-06-26 PROCEDURE — 3008F BODY MASS INDEX DOCD: CPT | Mod: CPTII,S$GLB,, | Performed by: FAMILY MEDICINE

## 2025-06-26 PROCEDURE — 1160F RVW MEDS BY RX/DR IN RCRD: CPT | Mod: CPTII,S$GLB,, | Performed by: FAMILY MEDICINE

## 2025-06-26 RX ORDER — ALPRAZOLAM 2 MG/1
2 TABLET ORAL 2 TIMES DAILY PRN
Qty: 60 TABLET | Refills: 2 | Status: SHIPPED | OUTPATIENT
Start: 2025-06-26

## 2025-07-08 NOTE — PROGRESS NOTES
Subjective     Patient ID: Bessy Egan is a 49 y.o. female.    Chief Complaint: Annual Exam    History of Present Illness    PRESENTATION:  Bessy presents for an annual wellness visit and to discuss medication refills.    HPI:  Bessy reports muscle spasms and cramps, with instances of rigid toes and leg spasms occurring even without prior exercise. She wonders if these symptoms could be due to a deficiency.    She reports recently elevated blood pressure. She had a runny nose for about 2 weeks, which has since improved.    Bessy has a history of kidney stones or debris. A recent x-ray showed decreased amount of debris in her kidney, suggesting she may have passed some stones. She recalls almost having a procedure to address these stones, but it was postponed due to a hysterectomy needed for severe bleeding.    She reports bladder spasms and a persistent, foul odor in her urine. Urine cultures have not shown bacterial growth. Her urologist recommended a 6-month course of low-dose antibiotics for this issue.    She denies problems with chest pain, shortness of breath, constipation, and diarrhea.    MEDICATIONS:  Bessy is on Xanax, taking 60 tablets over the last 2-3 months as needed. She also takes Nexium as needed and has Veozah, which she does not regularly take. She has discontinued nightly use of medication for hot flashes.    MEDICAL HISTORY:  Bessy has  kidney stones, hypertension, hyperlipidemia, gerd, and generalized anxiety.      SURGICAL HISTORY:  She underwent a hysterectomy due to severe bleeding.    TEST RESULTS:  In May 2023, the patient's CMP was normal. Her potassium level was 4.2, which was within the normal range.      ROS:  General: -fever, -chills, -fatigue, -weight gain, -weight loss  Eyes: -vision changes, -redness, -discharge  ENT: -ear pain, -nasal congestion, -sore throat, +runny nose  Cardiovascular: -chest pain, -palpitations, -lower extremity edema  Respiratory: -cough, -shortness of  breath  Gastrointestinal: -abdominal pain, -nausea, -vomiting, -diarrhea, -constipation, -blood in stool  Genitourinary: -dysuria, -hematuria, -frequency, +abnormal urine odor  Musculoskeletal: -joint pain, -muscle pain, +muscle cramps, +muscle spasms  Skin: -rash, -lesion  Neurological: -headache, -dizziness, -numbness, -tingling  Psychiatric: -anxiety, -depression, -sleep difficulty            Objective     Physical Exam  Vitals and nursing note reviewed.   Constitutional:       Appearance: Normal appearance. She is normal weight.   HENT:      Head: Normocephalic and atraumatic.      Right Ear: Tympanic membrane, ear canal and external ear normal.      Left Ear: Tympanic membrane, ear canal and external ear normal.      Nose: Nose normal.      Mouth/Throat:      Mouth: Mucous membranes are moist.      Pharynx: Oropharynx is clear.   Eyes:      Extraocular Movements: Extraocular movements intact.      Conjunctiva/sclera: Conjunctivae normal.   Cardiovascular:      Rate and Rhythm: Normal rate and regular rhythm.      Pulses: Normal pulses.      Heart sounds: Normal heart sounds.   Pulmonary:      Effort: Pulmonary effort is normal.      Breath sounds: Normal breath sounds.   Abdominal:      General: Abdomen is flat. Bowel sounds are normal.      Palpations: Abdomen is soft.   Musculoskeletal:         General: Normal range of motion.      Cervical back: Normal range of motion and neck supple.      Right lower leg: No edema.      Left lower leg: No edema.   Skin:     General: Skin is warm and dry.   Neurological:      General: No focal deficit present.      Mental Status: She is alert and oriented to person, place, and time.   Psychiatric:         Mood and Affect: Mood normal.         Behavior: Behavior normal.                Assessment and Plan     1. Routine general medical examination at a health care facility  Comments:  reviewed age appropriate screenings and immunizations  Orders:  -     T4, Free; Future;  Expected date: 07/11/2025  -     TSH; Future; Expected date: 07/11/2025  -     Hemoglobin A1C; Future; Expected date: 07/11/2025  -     Lipid Panel; Future; Expected date: 07/11/2025  -     CBC Auto Differential; Future; Expected date: 07/11/2025    2. Generalized anxiety disorder  Comments:  stable on xanax  Overview:  stable on xanax    Orders:  -     ALPRAZolam (XANAX) 2 MG Tab; Take 1 tablet (2 mg total) by mouth 2 (two) times daily as needed (anxiety).  Dispense: 60 tablet; Refill: 2    3. Muscle cramps    4. Primary hypertension  Comments:  stable cont marily    5. Mixed hyperlipidemia  Comments:  stable cont rosuvastatin    6. Gastroesophageal reflux disease without esophagitis  Comments:  stable cont nexium  Overview:  Gastroesophageal reflux disease          Assessment & Plan    Assessed medication needs and refilled Xanax, noting use of approximately 60 tablets over 2-3 months and continued with additional refills.  Evaluated urinary symptoms and ongoing antibiotic treatment plan from urologist, noting presence of non-pathogenic bacteria potentially causing odor.  Discussed rationale for long-term, low-dose antibiotic use to address urinary symptoms caused by non-pathogenic bacteria.  Considered magnesium supplementation for reported muscle spasms and cramps, ruling out potassium deficiency based on recent normal lab results.  Reviewed overall health status, including recent procedures and ongoing medical management.    ACTION ITEMS/LIFESTYLE:   Bessy to get labs done at any [facility] location when convenient.    MEDICATIONS:   Started magnesium glycinate 400 mg daily for muscle spasms and cramps.    ORDERS:   Ordered pneumonia vaccine to be administered in office.   Ordered CBC, lipid panel, thyroid level, and diabetes screening.    FOLLOW UP:   Follow up in 6 months.              Follow up in about 6 months (around 12/26/2025).    This note was generated with the assistance of ambient listening technology.  Verbal consent was obtained by the patient and accompanying visitor(s) for the recording of patient appointment to facilitate this note. I attest to having reviewed and edited the generated note for accuracy, though some syntax or spelling errors may persist. Please contact the author of this note for any clarification.

## 2025-07-28 ENCOUNTER — TELEPHONE (OUTPATIENT)
Dept: PHARMACY | Facility: CLINIC | Age: 50
End: 2025-07-28
Payer: COMMERCIAL

## 2025-07-29 NOTE — TELEPHONE ENCOUNTER
[facility] Chart Review & Patient Outreach Details For Medication Adherence Project    Reason for Outreach Encounter: 3rd Party payor non-compliance report ([employer], [employer], C, etc)  2.  Patient Outreach Method: Reviewed Patient Chart  3.   Medication in question: zackary   LAST FILLED: 5/27/25 for 90 day supply  Hypertension Medications              amlodipine-olmesartan (ZACKARY) 5-20 mg per tablet Take 1 tablet by mouth once daily.              4.  Reviewed and or Updates Made To: Patient Chart  5. Outreach Outcomes and/or actions taken: Patient filled medication and is on track to be adherent

## 2025-08-10 ENCOUNTER — TELEPHONE (OUTPATIENT)
Dept: PHARMACY | Facility: CLINIC | Age: 50
End: 2025-08-10
Payer: COMMERCIAL